# Patient Record
Sex: MALE | URBAN - METROPOLITAN AREA
[De-identification: names, ages, dates, MRNs, and addresses within clinical notes are randomized per-mention and may not be internally consistent; named-entity substitution may affect disease eponyms.]

---

## 2024-05-22 DIAGNOSIS — D72.829 LEUKOCYTOSIS, UNSPECIFIED TYPE: Primary | ICD-10-CM

## 2024-06-03 ENCOUNTER — TELEPHONE (OUTPATIENT)
Dept: HEMATOLOGY/ONCOLOGY | Facility: CLINIC | Age: 44
End: 2024-06-03

## 2025-02-27 ENCOUNTER — OFFICE VISIT (OUTPATIENT)
Dept: HEMATOLOGY/ONCOLOGY | Facility: CLINIC | Age: 45
End: 2025-02-27
Attending: INTERNAL MEDICINE
Payer: MEDICAID

## 2025-02-27 VITALS
SYSTOLIC BLOOD PRESSURE: 117 MMHG | BODY MASS INDEX: 42.95 KG/M2 | RESPIRATION RATE: 18 BRPM | HEIGHT: 65 IN | OXYGEN SATURATION: 96 % | WEIGHT: 257.81 LBS | HEART RATE: 98 BPM | TEMPERATURE: 98 F | DIASTOLIC BLOOD PRESSURE: 85 MMHG

## 2025-02-27 DIAGNOSIS — K52.9 CHRONIC DIARRHEA: ICD-10-CM

## 2025-02-27 DIAGNOSIS — F14.11 HISTORY OF COCAINE ABUSE: ICD-10-CM

## 2025-02-27 DIAGNOSIS — F15.11 HISTORY OF METHAMPHETAMINE ABUSE: ICD-10-CM

## 2025-02-27 DIAGNOSIS — Z86.19 HISTORY OF HEPATITIS C: ICD-10-CM

## 2025-02-27 DIAGNOSIS — D72.829 LEUKOCYTOSIS, UNSPECIFIED TYPE: ICD-10-CM

## 2025-02-27 DIAGNOSIS — D75.1 ERYTHROCYTOSIS: Primary | ICD-10-CM

## 2025-02-27 DIAGNOSIS — Z87.891 HISTORY OF SMOKING: ICD-10-CM

## 2025-02-27 DIAGNOSIS — F12.91 HISTORY OF MARIJUANA USE: ICD-10-CM

## 2025-02-27 DIAGNOSIS — F10.11 HISTORY OF ALCOHOL ABUSE: ICD-10-CM

## 2025-02-27 DIAGNOSIS — T38.3X5A ADVERSE EFFECT OF SODIUM-GLUCOSE COTRANSPORTER 2 (SGLT2) INHIBITOR: ICD-10-CM

## 2025-02-27 LAB
AFP-TM SERPL-MCNC: 2.8 NG/ML
ALBUMIN SERPL-MCNC: 4.1 G/DL (ref 3.5–5)
ALBUMIN/GLOB SERPL: 1 RATIO (ref 1.1–2)
ALP SERPL-CCNC: 97 UNIT/L (ref 40–150)
ALT SERPL-CCNC: 27 UNIT/L (ref 0–55)
ANION GAP SERPL CALC-SCNC: 9 MEQ/L
AST SERPL-CCNC: 17 UNIT/L (ref 5–34)
BASOPHILS # BLD AUTO: 0.08 X10(3)/MCL
BASOPHILS NFR BLD AUTO: 0.5 %
BILIRUB SERPL-MCNC: 0.3 MG/DL
BUN SERPL-MCNC: 11.8 MG/DL (ref 8.9–20.6)
CALCIUM SERPL-MCNC: 9.9 MG/DL (ref 8.4–10.2)
CHLORIDE SERPL-SCNC: 106 MMOL/L (ref 98–107)
CO2 SERPL-SCNC: 23 MMOL/L (ref 22–29)
CREAT SERPL-MCNC: 0.69 MG/DL (ref 0.72–1.25)
CREAT/UREA NIT SERPL: 17
EOSINOPHIL # BLD AUTO: 0.43 X10(3)/MCL (ref 0–0.9)
EOSINOPHIL NFR BLD AUTO: 2.6 %
ERYTHROCYTE [DISTWIDTH] IN BLOOD BY AUTOMATED COUNT: 11.6 % (ref 11.5–17)
GFR SERPLBLD CREATININE-BSD FMLA CKD-EPI: >60 ML/MIN/1.73/M2
GLOBULIN SER-MCNC: 4.1 GM/DL (ref 2.4–3.5)
GLUCOSE SERPL-MCNC: 140 MG/DL (ref 74–100)
HCT VFR BLD AUTO: 50.3 % (ref 42–52)
HGB BLD-MCNC: 17.5 G/DL (ref 14–18)
IMM GRANULOCYTES # BLD AUTO: 0.09 X10(3)/MCL (ref 0–0.04)
IMM GRANULOCYTES NFR BLD AUTO: 0.5 %
LYMPHOCYTES # BLD AUTO: 3.52 X10(3)/MCL (ref 0.6–4.6)
LYMPHOCYTES NFR BLD AUTO: 21.4 %
MCH RBC QN AUTO: 30.8 PG (ref 27–31)
MCHC RBC AUTO-ENTMCNC: 34.8 G/DL (ref 33–36)
MCV RBC AUTO: 88.4 FL (ref 80–94)
MONOCYTES # BLD AUTO: 1.02 X10(3)/MCL (ref 0.1–1.3)
MONOCYTES NFR BLD AUTO: 6.2 %
NEUTROPHILS # BLD AUTO: 11.34 X10(3)/MCL (ref 2.1–9.2)
NEUTROPHILS NFR BLD AUTO: 68.8 %
NRBC BLD AUTO-RTO: 0 %
PLATELET # BLD AUTO: 329 X10(3)/MCL (ref 130–400)
PMV BLD AUTO: 10.4 FL (ref 7.4–10.4)
POTASSIUM SERPL-SCNC: 3.7 MMOL/L (ref 3.5–5.1)
PROT SERPL-MCNC: 8.2 GM/DL (ref 6.4–8.3)
RBC # BLD AUTO: 5.69 X10(6)/MCL (ref 4.7–6.1)
SODIUM SERPL-SCNC: 138 MMOL/L (ref 136–145)
WBC # BLD AUTO: 16.48 X10(3)/MCL (ref 4.5–11.5)

## 2025-02-27 PROCEDURE — 99204 OFFICE O/P NEW MOD 45 MIN: CPT | Mod: S$PBB,,, | Performed by: INTERNAL MEDICINE

## 2025-02-27 PROCEDURE — 82105 ALPHA-FETOPROTEIN SERUM: CPT | Performed by: INTERNAL MEDICINE

## 2025-02-27 PROCEDURE — 1160F RVW MEDS BY RX/DR IN RCRD: CPT | Mod: CPTII,,, | Performed by: INTERNAL MEDICINE

## 2025-02-27 PROCEDURE — 3008F BODY MASS INDEX DOCD: CPT | Mod: CPTII,,, | Performed by: INTERNAL MEDICINE

## 2025-02-27 PROCEDURE — 85025 COMPLETE CBC W/AUTO DIFF WBC: CPT | Performed by: INTERNAL MEDICINE

## 2025-02-27 PROCEDURE — 82668 ASSAY OF ERYTHROPOIETIN: CPT | Performed by: INTERNAL MEDICINE

## 2025-02-27 PROCEDURE — 3079F DIAST BP 80-89 MM HG: CPT | Mod: CPTII,,, | Performed by: INTERNAL MEDICINE

## 2025-02-27 PROCEDURE — 1159F MED LIST DOCD IN RCRD: CPT | Mod: CPTII,,, | Performed by: INTERNAL MEDICINE

## 2025-02-27 PROCEDURE — 3074F SYST BP LT 130 MM HG: CPT | Mod: CPTII,,, | Performed by: INTERNAL MEDICINE

## 2025-02-27 PROCEDURE — 99214 OFFICE O/P EST MOD 30 MIN: CPT | Mod: PBBFAC | Performed by: INTERNAL MEDICINE

## 2025-02-27 PROCEDURE — 36415 COLL VENOUS BLD VENIPUNCTURE: CPT | Mod: 91 | Performed by: INTERNAL MEDICINE

## 2025-02-27 PROCEDURE — 4010F ACE/ARB THERAPY RXD/TAKEN: CPT | Mod: CPTII,,, | Performed by: INTERNAL MEDICINE

## 2025-02-27 PROCEDURE — 80053 COMPREHEN METABOLIC PANEL: CPT | Performed by: INTERNAL MEDICINE

## 2025-02-27 RX ORDER — CLONIDINE HYDROCHLORIDE 0.1 MG/1
0.1 TABLET ORAL 2 TIMES DAILY
COMMUNITY

## 2025-02-27 RX ORDER — FENOFIBRATE 145 MG/1
145 TABLET, FILM COATED ORAL
COMMUNITY

## 2025-02-27 RX ORDER — AMLODIPINE BESYLATE 10 MG/1
10 TABLET ORAL
COMMUNITY
Start: 2025-01-27

## 2025-02-27 RX ORDER — EMPAGLIFLOZIN 25 MG/1
25 TABLET, FILM COATED ORAL
COMMUNITY

## 2025-02-27 RX ORDER — METFORMIN HYDROCHLORIDE 500 MG/1
500 TABLET ORAL
COMMUNITY

## 2025-02-27 RX ORDER — TAMSULOSIN HYDROCHLORIDE 0.4 MG/1
1 CAPSULE ORAL
COMMUNITY
Start: 2025-01-27

## 2025-02-27 RX ORDER — LISINOPRIL 10 MG/1
10 TABLET ORAL
COMMUNITY

## 2025-02-27 RX ORDER — ROSUVASTATIN CALCIUM 10 MG/1
10 TABLET, COATED ORAL
COMMUNITY
Start: 2025-01-27

## 2025-02-27 RX ORDER — ASPIRIN 325 MG
50000 TABLET, DELAYED RELEASE (ENTERIC COATED) ORAL
COMMUNITY

## 2025-02-27 RX ORDER — GLIMEPIRIDE 1 MG/1
1 TABLET ORAL EVERY MORNING
COMMUNITY
Start: 2025-01-27

## 2025-02-27 NOTE — Clinical Note
Orders for 02/27/2025:  Check room air pulse oximetry, at rest, and after physical activity  Check JAK2 mutation, CALR mutation, MPL mutation, BCR-ABL1 fusion transcript in blood Check erythropoietin level Check carboxyhemoglobin level Whether the patient needs evaluation with bone marrow aspiration and core biopsy, we will be decided based upon results of requested investigations. Follow-up in 3 weeks

## 2025-02-27 NOTE — PROGRESS NOTES
History:  History reviewed. No pertinent past medical history.    Past Surgical History:   Procedure Laterality Date    COLONOSCOPY        Social History     Socioeconomic History    Marital status: Unknown   Tobacco Use    Smoking status: Every Day     Types: Cigarettes    Smokeless tobacco: Never   Substance and Sexual Activity    Alcohol use: Yes     Comment: occ    Drug use: Not Currently      Family History   Problem Relation Name Age of Onset    Diabetes Father      Hypertension Father          Reason for Follow-up:  Reason for consultation:   Erythrocytosis  Mild leukocytosis  History of hep C, treated  Chronic diarrhea    History of Present Illness:   Leukocytosis        Oncologic/Hematologic History:  Oncology History    No history exists.   Past medical history:  NIDDM; essential hypertension; transaminitis; history of hep C (treated by a GI in [place], 3 years ago); COVID-19 (brief hospitalization 02/07/2023-02/08/2023); dyslipidemia; hypovitaminosis D; low back pain; lymphocytosis; history of smoking; history of alcohol abuse; history of marijuana, methamphetamines, cocaine abuse  Procedure/surgical history:  Tonsillectomy 1987; left elbow fracture requiring cost 1989; left forearm fracture requiring gas 1995; right hand fracture 1997; uvula mass excision 2021  Social history: Lives in [place].  Has a girlfriend.  To children who are grown and gone.  Currently, not working.  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  Says that he has not worked in 5 years.  Says that the reason quit working, was because he has 5-20 loose bowel movements daily, probably as a side effect of medication that he is taking.  History of smoking: Smoked 1-2 ppd X 20 years, now down to 5-10 cigarettes daily x2 years; in his younger years, was exposed to secondhand smoke from his mother and grandparents  History of alcohol abuse: Used to drink 1 or 230 pack beer daily; drank for 8 years; lately, down  to 2 beers daily  History of substance Abuse:  Use marijuana, methamphetamine, and cocaine for several years; quit 5-6 years ago  Family history:  Both grandparents experienced melanoma of skin.  Health maintenance:  PCP in [place]  -02/27/2025: Says that he had screening colonoscopy done in [place] in January 2025, and that it was normal; did show small hemorrhoids; apparently, no polyps or cancer      44-year-old gentleman, referred by Maximiliano Parra MD, [facility], for evaluation of leukocytosis.    Investigations reviewed:  -10/31/2023:  WBC 12.6 minimally elevated, hemoglobin 17.5 elevated, hematocrit 51.6 elevated, MCV normal, platelets 350 K normal, neutrophils 62%, lymphocytes 25%, monocytes 7%  -01/30/2024:  WBC 11.9 minimally elevated, hemoglobin 18.2 elevated, hematocrit 54.5 elevated, MCV normal, platelets 330 K, neutrophils 63%, lymphocytes 25%  -05/14/2024:  WBC 13.3 minimally elevated, hemoglobin 17.7, hematocrit 52.4, MCV normal, platelets 319 K, neutrophils 64%, lymphocytes 24%, ANC 9.0 K, ALC 3.3 K  -05/23/2024: WBC 11.8 minimally elevated, hemoglobin 17.4, hematocrit 51.2, MCV normal, platelets 306 K, neutrophils 60%, lymphocytes 27%, monocytes 7%, eosinophils 4%, ANC 7.3 K, lymphocytes 3.2 K eosinophils 0.5 K  -08/14/2024: WBC 12.7, hemoglobin 17.1, hematocrit 50.4, MCV normal, platelets 323 K, neutrophils 58%, lymphocytes 28%, monocytes 8%, eosinophils 4%, basophils 1%  -11/14/2024: WBC 13.1 slightly elevated, hemoglobin 17.6, hematocrit 52.3, MCV normal, platelets 329 K, neutrophils 63%, lymphocytes 25%, monocytes 7%, eosinophils 3%, basophils 1%  -02/14/2025:  WBC 12.7 K, hemoglobin 17.2, hematocrit 51.2, MCV normal, platelets 343 K, neutrophils 60%, lymphocytes 26%, monocytes 7%, eosinophils 3%, basophils 1%    -10/31/2023: CMP unremarkable  -01/30/2024:  CMP unremarkable  -05/14/2024: CMP unremarkable  -05/23/2024: CMP unremarkable      02/27/2025:  Franky  healthy-appearing gentleman who presents for initial hematology consultation.  In no acute discomfort.    Chronic smoker.  Chronic alcoholic.  Has cut down on smoking.  Has cut down on alcohol.  Has quit marijuana, methamphetamine, and cocaine.  No aquagenic pruritus.  However, always has itching.  No erythromelalgia.  No history of fevers, chills, night sweats, anorexia, unintentional weight loss, or bone pains.  Chronic weakness and fatigue.  However, ECOG 0.  Appetite is okay.  No history of blood clots.  No history of DVT or pulmonary embolism.  No history of blood clots in unusual locations like portal vein, splenic vein, cerebral veins, etc..  No history of malignancy.  No history of exposure to carbon monoxide.  Not exposed to automobile exhausted.  Not exposed to Mariya or fumes.  Does not work enclosed spaces, exposed to weekly exon cysts.  No history of COPD that he is aware of.  Denies chronic dyspnea.  No heart problems.  No history of hereditary erythrocytosis.  No history of CKD/renal artery stenosis/renal cysts/hydronephrosis.  No history of adrenal tumors.  No history of cerebellar hemangiomas, hepatomas, renal cell carcinoma, or pheochromocytoma.  No history of blood dropping.  Does not use anabolic steroids.  No chest pains, abdominal pains, myalgias, weakness, fatigue, headache, blurred vision, transient loss of vision, paresthesias, slow mentation, or sense of depersonalization.  No history of LAILA.  No daytime sleepiness.  No history of cyanotic heart disease.  No history of intracardiac or intrapulmonary shunts.  No history of mucocutaneous telangiectasias.  Does not self inject erythropoiesis stimulating agents.    Interval History:  [No matching plan found]   [No matching plan found]     Allergies as of 02/27/2025    (No Known Allergies)     Medications:  Medications Ordered Prior to Encounter[1]    Review of Systems:   All systems reviewed and found to be negative except for the symptoms  detailed above    Physical Examination:   VITAL SIGNS:   Vitals:    02/27/25 1318   BP: 117/85   Pulse: 98   Resp: 18   Temp: 98.4 °F (36.9 °C)     GENERAL:  In no apparent distress.    HEAD:  No signs of head trauma.  EYES:  Pupils are equal.  Extraocular motions intact.    EARS:  Hearing grossly intact.  MOUTH:  Oropharynx is normal.   NECK:  No adenopathy, no JVD.     CHEST:  Chest with clear breath sounds bilaterally.  No wheezes, rales, rhonchi.    CARDIAC:  Regular rate and rhythm.  S1 and S2, without murmurs, gallops, rubs.  VASCULAR:  No Edema.  Peripheral pulses normal and equal in all extremities.  ABDOMEN:  Soft, without detectable tenderness.  No sign of distention.  No   rebound or guarding, and no masses palpated.   Bowel Sounds normal.  MUSCULOSKELETAL:  Good range of motion of all major joints. Extremities without clubbing, cyanosis or edema.    NEUROLOGIC EXAM:  Alert and oriented x 3.  No focal sensory or strength deficits.   Speech normal.  Follows commands.  PSYCHIATRIC:  Mood normal.    No results found for this or any previous visit.  No results found for this or any previous visit.    Assessment:  Problem List Items Addressed This Visit       Erythrocytosis - Primary    Leukocytosis    History of hepatitis C    Chronic diarrhea     Orders for 02/27/2025:   Check room air pulse oximetry, at rest, and after physical activity   Check JAK2 mutation, CALR mutation, MPL mutation, BCR-ABL1 fusion transcript in blood  Check erythropoietin level  Check carboxyhemoglobin level  Whether the patient needs evaluation with bone marrow aspiration and core biopsy, we will be decided based upon results of requested investigations.  -check AFP level   -ultrasound of liver and spleen, rule out any lesions/liver cirrhosis  Follow-up in 3 weeks    Above discussed at length with the patient.  All questions answered.    Discussed labs, differential diagnosis, and plan of management.    He understands and agrees with  this plan.  ===================================    # Erythrocytosis:     -10/31/2023:  WBC 12.6 minimally elevated, hemoglobin 17.5 elevated, hematocrit 51.6 elevated, MCV normal, platelets 350 K normal, neutrophils 62%, lymphocytes 25%, monocytes 7%  -01/30/2024:  WBC 11.9 minimally elevated, hemoglobin 18.2 elevated, hematocrit 54.5 elevated, MCV normal, platelets 330 K, neutrophils 63%, lymphocytes 25%  -05/14/2024:  WBC 13.3 minimally elevated, hemoglobin 17.7, hematocrit 52.4, MCV normal, platelets 319 K, neutrophils 64%, lymphocytes 24%, ANC 9.0 K, ALC 3.3 K  -05/23/2024: WBC 11.8 minimally elevated, hemoglobin 17.4, hematocrit 51.2, MCV normal, platelets 306 K, neutrophils 60%, lymphocytes 27%, monocytes 7%, eosinophils 4%, ANC 7.3 K, lymphocytes 3.2 K eosinophils 0.5 K  -08/14/2024: WBC 12.7, hemoglobin 17.1, hematocrit 50.4, MCV normal, platelets 323 K, neutrophils 58%, lymphocytes 28%, monocytes 8%, eosinophils 4%, basophils 1%  -11/14/2024: WBC 13.1 slightly elevated, hemoglobin 17.6, hematocrit 52.3, MCV normal, platelets 329 K, neutrophils 63%, lymphocytes 25%, monocytes 7%, eosinophils 3%, basophils 1%  -02/14/2025:  WBC 12.7 K, hemoglobin 17.2, hematocrit 51.2, MCV normal, platelets 343 K, neutrophils 60%, lymphocytes 26%, monocytes 7%, eosinophils 3%, basophils 1%  -History of smoking: Smoked 1-2 ppd X 20 years, now down to 5-10 cigarettes daily x2 years; in his younger years, was exposed to secondhand smoke from his mother and grandparents  -History of alcohol abuse: Used to drink 1 or 230 pack beer daily; drank for 8 years; lately, down to 2 beers daily  -History of substance Abuse:  Use marijuana, methamphetamine, and cocaine for several years; quit 5-6 years ago  -02/27/2025: Room air pulse oximetry:  96% at rest, normal; 95% after physical activity, normal  -02/27/2025:  Carboxyhemoglobin 2.2%, normal  >>>  Plan:  Erythrocytosis   Need to establish secondary erythrocytosis versus polycythemia  vera  Check room air pulse oximetry, at rest, and after physical activity   Check JAK2 mutation, CALR mutation, MPL mutation, BCR-ABL1 fusion transcript in blood  Check erythropoietin level  Carboxyhemoglobin level normal (see above)  Room air pulse oximetry normal (see above)  Whether the patient needs evaluation with bone marrow aspiration and core biopsy, we will be decided based upon results of requested investigations.  -check AFP level   -ultrasound of liver and spleen to rule out liver cirrhosis/liver lesions (history of Hepatitis C, treated 3 years ago; history of alcohol abuse)  Erythrocytosis can be secondary to chronic tobacco abuse, causing a state of hypoxia; strongly advised to quit smoking  He is taking Jardiance [Empagliflozin, a Sodium-Glucose Cotransporter 2 (SGLT2) Inhibitor]  SGLT2 (sodium-glucose dl-pxsrqcucwff-3) inhibitors are known to cause erythrocytosis  Chronic watery diarrhea; up to 5-20 loose stools daily; he attributes this to side effects of medications that he is taking; this can create a state of hypovolemia which can spuriously elevated H/H.    Follow-up in 3 weeks    Above discussed at length with the patient.  All questions answered.    Discussed labs, differential diagnosis, and plan of management.    He understands and agrees with this plan.    Follow-up:  No follow-ups on file.           [1]   Current Outpatient Medications on File Prior to Visit   Medication Sig Dispense Refill    amLODIPine (NORVASC) 10 MG tablet Take 10 mg by mouth.      cholecalciferol, vitamin D3, 1,250 mcg (50,000 unit) capsule Take 50,000 Units by mouth every 7 days.      cloNIDine (CATAPRES) 0.1 MG tablet Take 0.1 mg by mouth 2 (two) times daily.      fenofibrate (TRICOR) 145 MG tablet Take 145 mg by mouth.      glimepiride (AMARYL) 1 MG tablet Take 1 mg by mouth every morning.      JARDIANCE 25 mg tablet Take 25 mg by mouth.      lisinopriL 10 MG tablet Take 10 mg by mouth.      metFORMIN (GLUCOPHAGE)  500 MG tablet Take 500 mg by mouth.      rosuvastatin (CRESTOR) 10 MG tablet Take 10 mg by mouth.      tamsulosin (FLOMAX) 0.4 mg Cap Take 1 capsule by mouth.       No current facility-administered medications on file prior to visit.

## 2025-02-28 LAB — EPO SERPL-ACNC: 8.8 MIU/ML (ref 2.6–18.5)

## 2025-03-11 ENCOUNTER — HOSPITAL ENCOUNTER (OUTPATIENT)
Dept: RADIOLOGY | Facility: HOSPITAL | Age: 45
Discharge: HOME | End: 2025-03-11
Attending: INTERNAL MEDICINE
Payer: MEDICAID

## 2025-03-11 DIAGNOSIS — D75.1 ERYTHROCYTOSIS: ICD-10-CM

## 2025-03-11 DIAGNOSIS — D72.829 LEUKOCYTOSIS, UNSPECIFIED TYPE: ICD-10-CM

## 2025-03-11 DIAGNOSIS — Z86.19 HISTORY OF HEPATITIS C: ICD-10-CM

## 2025-03-11 PROCEDURE — 76700 US EXAM ABDOM COMPLETE: CPT | Mod: TC

## 2025-04-02 ENCOUNTER — TELEPHONE (OUTPATIENT)
Dept: HEMATOLOGY/ONCOLOGY | Facility: CLINIC | Age: 45
End: 2025-04-02
Payer: MEDICAID

## 2025-04-03 ENCOUNTER — TELEPHONE (OUTPATIENT)
Dept: HEMATOLOGY/ONCOLOGY | Facility: CLINIC | Age: 45
End: 2025-04-03
Payer: MEDICAID

## 2025-04-29 ENCOUNTER — OFFICE VISIT (OUTPATIENT)
Dept: HEMATOLOGY/ONCOLOGY | Facility: CLINIC | Age: 45
End: 2025-04-29
Attending: INTERNAL MEDICINE
Payer: MEDICAID

## 2025-04-29 VITALS
HEIGHT: 65 IN | HEART RATE: 90 BPM | SYSTOLIC BLOOD PRESSURE: 120 MMHG | RESPIRATION RATE: 18 BRPM | BODY MASS INDEX: 42.32 KG/M2 | DIASTOLIC BLOOD PRESSURE: 75 MMHG | OXYGEN SATURATION: 96 % | TEMPERATURE: 98 F | WEIGHT: 254 LBS

## 2025-04-29 DIAGNOSIS — K52.9 CHRONIC DIARRHEA: ICD-10-CM

## 2025-04-29 DIAGNOSIS — K76.0 HEPATIC STEATOSIS: ICD-10-CM

## 2025-04-29 DIAGNOSIS — D72.829 LEUKOCYTOSIS, UNSPECIFIED TYPE: ICD-10-CM

## 2025-04-29 DIAGNOSIS — F12.91 HISTORY OF MARIJUANA USE: ICD-10-CM

## 2025-04-29 DIAGNOSIS — F14.11 HISTORY OF COCAINE ABUSE: ICD-10-CM

## 2025-04-29 DIAGNOSIS — R16.0 HEPATOMEGALY: ICD-10-CM

## 2025-04-29 DIAGNOSIS — F10.11 HISTORY OF ALCOHOL ABUSE: Primary | ICD-10-CM

## 2025-04-29 DIAGNOSIS — D75.1 ERYTHROCYTOSIS: ICD-10-CM

## 2025-04-29 DIAGNOSIS — D75.1 ERYTHROCYTOSIS: Primary | ICD-10-CM

## 2025-04-29 DIAGNOSIS — F15.11 HISTORY OF METHAMPHETAMINE ABUSE: ICD-10-CM

## 2025-04-29 PROCEDURE — 1159F MED LIST DOCD IN RCRD: CPT | Mod: CPTII,,, | Performed by: INTERNAL MEDICINE

## 2025-04-29 PROCEDURE — 3074F SYST BP LT 130 MM HG: CPT | Mod: CPTII,,, | Performed by: INTERNAL MEDICINE

## 2025-04-29 PROCEDURE — 4010F ACE/ARB THERAPY RXD/TAKEN: CPT | Mod: CPTII,,, | Performed by: INTERNAL MEDICINE

## 2025-04-29 PROCEDURE — 3008F BODY MASS INDEX DOCD: CPT | Mod: CPTII,,, | Performed by: INTERNAL MEDICINE

## 2025-04-29 PROCEDURE — 99214 OFFICE O/P EST MOD 30 MIN: CPT | Mod: PBBFAC | Performed by: INTERNAL MEDICINE

## 2025-04-29 PROCEDURE — 99214 OFFICE O/P EST MOD 30 MIN: CPT | Mod: S$PBB,,, | Performed by: INTERNAL MEDICINE

## 2025-04-29 PROCEDURE — 3078F DIAST BP <80 MM HG: CPT | Mod: CPTII,,, | Performed by: INTERNAL MEDICINE

## 2025-04-29 PROCEDURE — 1160F RVW MEDS BY RX/DR IN RCRD: CPT | Mod: CPTII,,, | Performed by: INTERNAL MEDICINE

## 2025-04-29 RX ORDER — SITAGLIPTIN 100 MG/1
100 TABLET, FILM COATED ORAL
COMMUNITY
Start: 2025-04-15

## 2025-04-29 NOTE — PROGRESS NOTES
History:  History reviewed. No pertinent past medical history.    Past Surgical History:   Procedure Laterality Date    COLONOSCOPY        Social History     Socioeconomic History    Marital status: Single   Tobacco Use    Smoking status: Every Day     Types: Cigarettes    Smokeless tobacco: Never   Substance and Sexual Activity    Alcohol use: Yes     Comment: occ    Drug use: Not Currently      Family History   Problem Relation Name Age of Onset    Diabetes Father      Hypertension Father        Reason for Follow-up:  Erythrocytosis  Mild leukocytosis  History of hep C, treated  Hepatic steatosis, hepatomegaly  Chronic diarrhea    History of Present Illness:   Erythrocytosis        Oncologic/Hematologic History:  Oncology History    No history exists.   Past medical history:  NIDDM; essential hypertension; transaminitis; history of hep C (treated by a GI in [place], 3 years ago); COVID-19 (brief hospitalization 02/07/2023-02/08/2023); dyslipidemia; hypovitaminosis D; low back pain; lymphocytosis; history of smoking; history of alcohol abuse; history of marijuana, methamphetamines, cocaine abuse  Procedure/surgical history:  Tonsillectomy 1987; left elbow fracture requiring cost 1989; left forearm fracture requiring gas 1995; right hand fracture 1997; uvula mass excision 2021  Social history: Lives in [place].  Has a girlfriend.  To children who are grown and gone.  Currently, not working.  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  Says that he has not worked in 5 years.  Says that the reason quit working, was because he has 5-20 loose bowel movements daily, probably as a side effect of medication that he is taking.  History of smoking: Smoked 1-2 ppd X 20 years, now down to 5-10 cigarettes daily x2 years; in his younger years, was exposed to secondhand smoke from his mother and grandparents  History of alcohol abuse: Used to drink 1 or 230 pack beer daily; drank for 8 years; lately,  down to 2-3 beers yearly  History of substance Abuse:  Use marijuana, methamphetamine, and cocaine for several years; quit 5-6 years ago  Family history:  Both grandparents experienced melanoma of skin.  Health maintenance:  PCP in [place]  -02/27/2025: Says that he had screening colonoscopy done in [place] in January 2025, and that it was normal; did show small hemorrhoids; apparently, no polyps or cancer      44-year-old gentleman, referred by Maximiliano Parra MD, [facility], for evaluation of leukocytosis.    Investigations reviewed:  -10/31/2023:  WBC 12.6 minimally elevated, hemoglobin 17.5 elevated, hematocrit 51.6 elevated, MCV normal, platelets 350 K normal, neutrophils 62%, lymphocytes 25%, monocytes 7%  -01/30/2024:  WBC 11.9 minimally elevated, hemoglobin 18.2 elevated, hematocrit 54.5 elevated, MCV normal, platelets 330 K, neutrophils 63%, lymphocytes 25%  -05/14/2024:  WBC 13.3 minimally elevated, hemoglobin 17.7, hematocrit 52.4, MCV normal, platelets 319 K, neutrophils 64%, lymphocytes 24%, ANC 9.0 K, ALC 3.3 K  -05/23/2024: WBC 11.8 minimally elevated, hemoglobin 17.4, hematocrit 51.2, MCV normal, platelets 306 K, neutrophils 60%, lymphocytes 27%, monocytes 7%, eosinophils 4%, ANC 7.3 K, lymphocytes 3.2 K eosinophils 0.5 K  -08/14/2024: WBC 12.7, hemoglobin 17.1, hematocrit 50.4, MCV normal, platelets 323 K, neutrophils 58%, lymphocytes 28%, monocytes 8%, eosinophils 4%, basophils 1%  -11/14/2024: WBC 13.1 slightly elevated, hemoglobin 17.6, hematocrit 52.3, MCV normal, platelets 329 K, neutrophils 63%, lymphocytes 25%, monocytes 7%, eosinophils 3%, basophils 1%  -02/14/2025:  WBC 12.7 K, hemoglobin 17.2, hematocrit 51.2, MCV normal, platelets 343 K, neutrophils 60%, lymphocytes 26%, monocytes 7%, eosinophils 3%, basophils 1%    -10/31/2023: CMP unremarkable  -01/30/2024:  CMP unremarkable  -05/14/2024: CMP unremarkable  -05/23/2024: CMP unremarkable      02/27/2025:  Franky  healthy-appearing gentleman who presents for initial hematology consultation.  In no acute discomfort.    Chronic smoker.  Chronic alcoholic.  Has cut down on smoking.  Has cut down on alcohol.  Has quit marijuana, methamphetamine, and cocaine.  No aquagenic pruritus.  However, always has itching.  No erythromelalgia.  No history of fevers, chills, night sweats, anorexia, unintentional weight loss, or bone pains.  Chronic weakness and fatigue.  However, ECOG 0.  Appetite is okay.  No history of blood clots.  No history of DVT or pulmonary embolism.  No history of blood clots in unusual locations like portal vein, splenic vein, cerebral veins, etc..  No history of malignancy.  No history of exposure to carbon monoxide.  Not exposed to automobile exhausted.  Not exposed to Mariya or fumes.  Does not work enclosed spaces, exposed to weekly exon cysts.  No history of COPD that he is aware of.  Denies chronic dyspnea.  No heart problems.  No history of hereditary erythrocytosis.  No history of CKD/renal artery stenosis/renal cysts/hydronephrosis.  No history of adrenal tumors.  No history of cerebellar hemangiomas, hepatomas, renal cell carcinoma, or pheochromocytoma.  No history of blood dropping.  Does not use anabolic steroids.  No chest pains, abdominal pains, myalgias, weakness, fatigue, headache, blurred vision, transient loss of vision, paresthesias, slow mentation, or sense of depersonalization.  No history of LAILA.  No daytime sleepiness.  No history of cyanotic heart disease.  No history of intracardiac or intrapulmonary shunts.  No history of mucocutaneous telangiectasias.  Does not self inject erythropoiesis stimulating agents.    Interval History:  [No matching plan found]   [No matching plan found]     04/29/2025:   -02/27/2025:  Peripheral blood:  JAK2 V617F mutation negative; JAK2 exon 12 mutation negative; JAK2 exon 13 mutation negative; MPL mutation negative; CALR mutation negative  -02/27/2025:   Peripheral blood: BCR-ABL1 fusion transcript negative  -02/27/2025:  WBC 16.48; hemoglobin 17.5; hematocrit 50.3; platelets normal; differential count unremarkable  -02/27/2025:  CMP reviewed, essentially unremarkable  -02/27/2025: AFP level 2.0 normal  02/27/2025:  Erythropoietin level 8.8 normal (reference Range:  2.6-18.5 mIU /ml)  -03/11/2025:  Complete abdominal ultrasound: Hepatic enlargement with steatosis; liver measures 23.4 cm; spleen measures 14.6 cm  Presents for a follow-up visit.  In no acute discomfort.  Chronic smoker.  Chronic alcoholic.  Has cut down on smoking.  Has cut down on alcohol.    Has quit marijuana, methamphetamine, and cocaine.    No aquagenic pruritus.  However, always has itching.  No erythromelalgia.  No history of fevers, chills, night sweats, anorexia, unintentional weight loss, or bone pains.    Chronic weakness and fatigue.  However, ECOG 0.    Appetite is okay.  No history of blood clots.  No history of DVT or pulmonary embolism.  No history of blood clots in unusual locations like portal vein, splenic vein, cerebral veins, etc..  No history of malignancy.  No history of exposure to carbon monoxide.  Not exposed to automobile exhausted.  Not exposed to Mariya or fumes.  Does not work enclosed spaces, exposed to weekly exon cysts.  No history of COPD that he is aware of.  Denies chronic dyspnea.  No heart problems.  No history of hereditary erythrocytosis.  No history of CKD/renal artery stenosis/renal cysts/hydronephrosis.  No history of adrenal tumors.  No history of cerebellar hemangiomas, hepatomas, renal cell carcinoma, or pheochromocytoma.  No history of blood dropping.  Does not use anabolic steroids.  No chest pains, abdominal pains, myalgias, weakness, fatigue, headache, blurred vision, transient loss of vision, paresthesias, slow mentation, or sense of depersonalization.  No history of LAILA.  No daytime sleepiness.  No history of cyanotic heart disease.  No history of  intracardiac or intrapulmonary shunts.  No history of mucocutaneous telangiectasias.  Does not self inject erythropoiesis stimulating agents.    Allergies as of 04/29/2025    (No Known Allergies)     Medications:  Medications Ordered Prior to Encounter[1]    Review of Systems:   All systems reviewed and found to be negative except for the symptoms detailed above    Physical Examination:   VITAL SIGNS:   Vitals:    04/29/25 1500   BP: 120/75   Pulse: 90   Resp: 18   Temp: 98.4 °F (36.9 °C)     GENERAL:  In no apparent distress.    HEAD:  No signs of head trauma.  EYES:  Pupils are equal.  Extraocular motions intact.    EARS:  Hearing grossly intact.  MOUTH:  Oropharynx is normal.   NECK:  No adenopathy, no JVD.     CHEST:  Chest with clear breath sounds bilaterally.  No wheezes, rales, rhonchi.    CARDIAC:  Regular rate and rhythm.  S1 and S2, without murmurs, gallops, rubs.  VASCULAR:  No Edema.  Peripheral pulses normal and equal in all extremities.  ABDOMEN:  Soft, without detectable tenderness.  No sign of distention.  No   rebound or guarding, and no masses palpated.   Bowel Sounds normal.  MUSCULOSKELETAL:  Good range of motion of all major joints. Extremities without clubbing, cyanosis or edema.    NEUROLOGIC EXAM:  Alert and oriented x 3.  No focal sensory or strength deficits.   Speech normal.  Follows commands.  PSYCHIATRIC:  Mood normal.    No results found for this or any previous visit.  No results found for this or any previous visit.    Assessment:  Problem List Items Addressed This Visit       Erythrocytosis    Chronic diarrhea    History of alcohol abuse - Primary    History of methamphetamine abuse    History of cocaine abuse    History of marijuana use    Hepatic steatosis    Hepatomegaly       Orders for 04/29/2025:   Bone marrow aspiration and core biopsy   Flow cytometry of blood (leukemia/lymphoma panel)  -04/29/2025: Refer to GI for hepatic steatosis  Follow-up in 3 weeks    Above discussed  with the patient.  All questions answered.   Discussed labs and plan of management.   He understands and agrees with this plan.  ===================================    # Erythrocytosis:     -10/31/2023:  WBC 12.6 minimally elevated, hemoglobin 17.5 elevated, hematocrit 51.6 elevated, MCV normal, platelets 350 K normal, neutrophils 62%, lymphocytes 25%, monocytes 7%  -01/30/2024:  WBC 11.9 minimally elevated, hemoglobin 18.2 elevated, hematocrit 54.5 elevated, MCV normal, platelets 330 K, neutrophils 63%, lymphocytes 25%  -05/14/2024:  WBC 13.3 minimally elevated, hemoglobin 17.7, hematocrit 52.4, MCV normal, platelets 319 K, neutrophils 64%, lymphocytes 24%, ANC 9.0 K, ALC 3.3 K  -05/23/2024: WBC 11.8 minimally elevated, hemoglobin 17.4, hematocrit 51.2, MCV normal, platelets 306 K, neutrophils 60%, lymphocytes 27%, monocytes 7%, eosinophils 4%, ANC 7.3 K, lymphocytes 3.2 K eosinophils 0.5 K  -08/14/2024: WBC 12.7, hemoglobin 17.1, hematocrit 50.4, MCV normal, platelets 323 K, neutrophils 58%, lymphocytes 28%, monocytes 8%, eosinophils 4%, basophils 1%  -11/14/2024: WBC 13.1 slightly elevated, hemoglobin 17.6, hematocrit 52.3, MCV normal, platelets 329 K, neutrophils 63%, lymphocytes 25%, monocytes 7%, eosinophils 3%, basophils 1%  -02/14/2025:  WBC 12.7 K, hemoglobin 17.2, hematocrit 51.2, MCV normal, platelets 343 K, neutrophils 60%, lymphocytes 26%, monocytes 7%, eosinophils 3%, basophils 1%  -History of smoking: Smoked 1-2 ppd X 20 years, now down to 5-10 cigarettes daily x2 years; in his younger years, was exposed to secondhand smoke from his mother and grandparents  -History of alcohol abuse: Used to drink 1 or 2 30 pack beer daily; drank for 8 years; lately, down to 2 beers daily  -History of substance Abuse:  Use marijuana, methamphetamine, and cocaine for several years; quit 5-6 years ago  -02/27/2025: Room air pulse oximetry:  96% at rest, normal; 95% after physical activity, normal  -02/27/2025:   Carboxyhemoglobin 2.2%, normal  -02/27/2025:  Peripheral blood:  JAK2 V617F mutation negative; JAK2 exon 12 mutation negative; JAK2 exon 13 mutation negative; MPL mutation negative; CALR mutation negative  -02/27/2025:  Peripheral blood: BCR-ABL1 fusion transcript negative  -02/27/2025:  WBC 16.48; hemoglobin 17.5; hematocrit 50.3; platelets normal; differential count unremarkable  -02/27/2025:  CMP reviewed, essentially unremarkable  -02/27/2025: AFP level 2.0 normal  02/27/2025:  Erythropoietin level 8.8 normal (reference Range:  2.6-18.5 mIU /ml)  -03/11/2025:  Complete abdominal ultrasound: Hepatic enlargement with steatosis; liver measures 23.4 cm; spleen measures 14.6 cm  >>>  Plan:  Chronic erythrocytosis   JAK2, MPL, CALR mutations negative  BCR-ABL1 fusion transcript negative  Mild, neutrophilic leukocytosis as well  History of smoking, has cut down   History of alcohol abuse, has cut down  History of substance abuse, has quit  Room air pulse oximetry normal   Carboxyhemoglobin level is not elevated  Erythropoietin level is not 7 normal but appears to be suppressed, 8.8, despite erythrocytosis  We will order bone marrow aspiration and core biopsy to rule out polycythemia vera  Abdominal ultrasound shows hepatomegaly and hepatic steatosis  AFP level normal on 02/27/2025  We will refer to GI for evaluation of hepatic steatosis and hepatomegaly    Erythrocytosis can be secondary to chronic tobacco abuse, causing a state of hypoxia; strongly advised to quit smoking  He is taking Jardiance [Empagliflozin, a Sodium-Glucose Cotransporter 2 (SGLT2) Inhibitor]  SGLT2 (sodium-glucose ur-nunybuwfqpa-7) inhibitors are known to cause erythrocytosis  Chronic watery diarrhea; up to 5-20 loose stools daily; he attributes this to side effects of medications that he is taking; this can create a state of hypovolemia which can spuriously elevated H/H.    Follow-up in 3 weeks, after bone marrow evaluation    Above discussed at  length with the patient.  All questions answered.    Discussed labs, differential diagnosis, and plan of management.    He understands and agrees with this plan.    Follow-up:  No follow-ups on file.             [1]   Current Outpatient Medications on File Prior to Visit   Medication Sig Dispense Refill    amLODIPine (NORVASC) 10 MG tablet Take 10 mg by mouth.      cholecalciferol, vitamin D3, 1,250 mcg (50,000 unit) capsule Take 50,000 Units by mouth every 7 days.      cloNIDine (CATAPRES) 0.1 MG tablet Take 0.1 mg by mouth 2 (two) times daily.      fenofibrate (TRICOR) 145 MG tablet Take 145 mg by mouth.      glimepiride (AMARYL) 1 MG tablet Take 1 mg by mouth every morning.      JANUVIA 100 mg Tab Take 100 mg by mouth.      JARDIANCE 25 mg tablet Take 25 mg by mouth.      lisinopriL 10 MG tablet Take 10 mg by mouth.      metFORMIN (GLUCOPHAGE) 500 MG tablet Take 500 mg by mouth.      rosuvastatin (CRESTOR) 10 MG tablet Take 10 mg by mouth.      tamsulosin (FLOMAX) 0.4 mg Cap Take 1 capsule by mouth.       No current facility-administered medications on file prior to visit.

## 2025-04-29 NOTE — H&P (VIEW-ONLY)
History:  History reviewed. No pertinent past medical history.    Past Surgical History:   Procedure Laterality Date    COLONOSCOPY        Social History     Socioeconomic History    Marital status: Single   Tobacco Use    Smoking status: Every Day     Types: Cigarettes    Smokeless tobacco: Never   Substance and Sexual Activity    Alcohol use: Yes     Comment: occ    Drug use: Not Currently      Family History   Problem Relation Name Age of Onset    Diabetes Father      Hypertension Father        Reason for Follow-up:  Erythrocytosis  Mild leukocytosis  History of hep C, treated  Hepatic steatosis, hepatomegaly  Chronic diarrhea    History of Present Illness:   Erythrocytosis        Oncologic/Hematologic History:  Oncology History    No history exists.   Past medical history:  NIDDM; essential hypertension; transaminitis; history of hep C (treated by a GI in [place], 3 years ago); COVID-19 (brief hospitalization 02/07/2023-02/08/2023); dyslipidemia; hypovitaminosis D; low back pain; lymphocytosis; history of smoking; history of alcohol abuse; history of marijuana, methamphetamines, cocaine abuse  Procedure/surgical history:  Tonsillectomy 1987; left elbow fracture requiring cost 1989; left forearm fracture requiring gas 1995; right hand fracture 1997; uvula mass excision 2021  Social history: Lives in [place].  Has a girlfriend.  To children who are grown and gone.  Currently, not working.  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  Says that he has not worked in 5 years.  Says that the reason quit working, was because he has 5-20 loose bowel movements daily, probably as a side effect of medication that he is taking.  History of smoking: Smoked 1-2 ppd X 20 years, now down to 5-10 cigarettes daily x2 years; in his younger years, was exposed to secondhand smoke from his mother and grandparents  History of alcohol abuse: Used to drink 1 or 230 pack beer daily; drank for 8 years; lately,  down to 2-3 beers yearly  History of substance Abuse:  Use marijuana, methamphetamine, and cocaine for several years; quit 5-6 years ago  Family history:  Both grandparents experienced melanoma of skin.  Health maintenance:  PCP in [place]  -02/27/2025: Says that he had screening colonoscopy done in [place] in January 2025, and that it was normal; did show small hemorrhoids; apparently, no polyps or cancer      44-year-old gentleman, referred by Maximiliano Parra MD, [facility], for evaluation of leukocytosis.    Investigations reviewed:  -10/31/2023:  WBC 12.6 minimally elevated, hemoglobin 17.5 elevated, hematocrit 51.6 elevated, MCV normal, platelets 350 K normal, neutrophils 62%, lymphocytes 25%, monocytes 7%  -01/30/2024:  WBC 11.9 minimally elevated, hemoglobin 18.2 elevated, hematocrit 54.5 elevated, MCV normal, platelets 330 K, neutrophils 63%, lymphocytes 25%  -05/14/2024:  WBC 13.3 minimally elevated, hemoglobin 17.7, hematocrit 52.4, MCV normal, platelets 319 K, neutrophils 64%, lymphocytes 24%, ANC 9.0 K, ALC 3.3 K  -05/23/2024: WBC 11.8 minimally elevated, hemoglobin 17.4, hematocrit 51.2, MCV normal, platelets 306 K, neutrophils 60%, lymphocytes 27%, monocytes 7%, eosinophils 4%, ANC 7.3 K, lymphocytes 3.2 K eosinophils 0.5 K  -08/14/2024: WBC 12.7, hemoglobin 17.1, hematocrit 50.4, MCV normal, platelets 323 K, neutrophils 58%, lymphocytes 28%, monocytes 8%, eosinophils 4%, basophils 1%  -11/14/2024: WBC 13.1 slightly elevated, hemoglobin 17.6, hematocrit 52.3, MCV normal, platelets 329 K, neutrophils 63%, lymphocytes 25%, monocytes 7%, eosinophils 3%, basophils 1%  -02/14/2025:  WBC 12.7 K, hemoglobin 17.2, hematocrit 51.2, MCV normal, platelets 343 K, neutrophils 60%, lymphocytes 26%, monocytes 7%, eosinophils 3%, basophils 1%    -10/31/2023: CMP unremarkable  -01/30/2024:  CMP unremarkable  -05/14/2024: CMP unremarkable  -05/23/2024: CMP unremarkable      02/27/2025:  Franky  healthy-appearing gentleman who presents for initial hematology consultation.  In no acute discomfort.    Chronic smoker.  Chronic alcoholic.  Has cut down on smoking.  Has cut down on alcohol.  Has quit marijuana, methamphetamine, and cocaine.  No aquagenic pruritus.  However, always has itching.  No erythromelalgia.  No history of fevers, chills, night sweats, anorexia, unintentional weight loss, or bone pains.  Chronic weakness and fatigue.  However, ECOG 0.  Appetite is okay.  No history of blood clots.  No history of DVT or pulmonary embolism.  No history of blood clots in unusual locations like portal vein, splenic vein, cerebral veins, etc..  No history of malignancy.  No history of exposure to carbon monoxide.  Not exposed to automobile exhausted.  Not exposed to Mariya or fumes.  Does not work enclosed spaces, exposed to weekly exon cysts.  No history of COPD that he is aware of.  Denies chronic dyspnea.  No heart problems.  No history of hereditary erythrocytosis.  No history of CKD/renal artery stenosis/renal cysts/hydronephrosis.  No history of adrenal tumors.  No history of cerebellar hemangiomas, hepatomas, renal cell carcinoma, or pheochromocytoma.  No history of blood dropping.  Does not use anabolic steroids.  No chest pains, abdominal pains, myalgias, weakness, fatigue, headache, blurred vision, transient loss of vision, paresthesias, slow mentation, or sense of depersonalization.  No history of LAILA.  No daytime sleepiness.  No history of cyanotic heart disease.  No history of intracardiac or intrapulmonary shunts.  No history of mucocutaneous telangiectasias.  Does not self inject erythropoiesis stimulating agents.    Interval History:  [No matching plan found]   [No matching plan found]     04/29/2025:   -02/27/2025:  Peripheral blood:  JAK2 V617F mutation negative; JAK2 exon 12 mutation negative; JAK2 exon 13 mutation negative; MPL mutation negative; CALR mutation negative  -02/27/2025:   Peripheral blood: BCR-ABL1 fusion transcript negative  -02/27/2025:  WBC 16.48; hemoglobin 17.5; hematocrit 50.3; platelets normal; differential count unremarkable  -02/27/2025:  CMP reviewed, essentially unremarkable  -02/27/2025: AFP level 2.0 normal  02/27/2025:  Erythropoietin level 8.8 normal (reference Range:  2.6-18.5 mIU /ml)  -03/11/2025:  Complete abdominal ultrasound: Hepatic enlargement with steatosis; liver measures 23.4 cm; spleen measures 14.6 cm  Presents for a follow-up visit.  In no acute discomfort.  Chronic smoker.  Chronic alcoholic.  Has cut down on smoking.  Has cut down on alcohol.    Has quit marijuana, methamphetamine, and cocaine.    No aquagenic pruritus.  However, always has itching.  No erythromelalgia.  No history of fevers, chills, night sweats, anorexia, unintentional weight loss, or bone pains.    Chronic weakness and fatigue.  However, ECOG 0.    Appetite is okay.  No history of blood clots.  No history of DVT or pulmonary embolism.  No history of blood clots in unusual locations like portal vein, splenic vein, cerebral veins, etc..  No history of malignancy.  No history of exposure to carbon monoxide.  Not exposed to automobile exhausted.  Not exposed to Mariya or fumes.  Does not work enclosed spaces, exposed to weekly exon cysts.  No history of COPD that he is aware of.  Denies chronic dyspnea.  No heart problems.  No history of hereditary erythrocytosis.  No history of CKD/renal artery stenosis/renal cysts/hydronephrosis.  No history of adrenal tumors.  No history of cerebellar hemangiomas, hepatomas, renal cell carcinoma, or pheochromocytoma.  No history of blood dropping.  Does not use anabolic steroids.  No chest pains, abdominal pains, myalgias, weakness, fatigue, headache, blurred vision, transient loss of vision, paresthesias, slow mentation, or sense of depersonalization.  No history of LAILA.  No daytime sleepiness.  No history of cyanotic heart disease.  No history of  intracardiac or intrapulmonary shunts.  No history of mucocutaneous telangiectasias.  Does not self inject erythropoiesis stimulating agents.    Allergies as of 04/29/2025    (No Known Allergies)     Medications:  Medications Ordered Prior to Encounter[1]    Review of Systems:   All systems reviewed and found to be negative except for the symptoms detailed above    Physical Examination:   VITAL SIGNS:   Vitals:    04/29/25 1500   BP: 120/75   Pulse: 90   Resp: 18   Temp: 98.4 °F (36.9 °C)     GENERAL:  In no apparent distress.    HEAD:  No signs of head trauma.  EYES:  Pupils are equal.  Extraocular motions intact.    EARS:  Hearing grossly intact.  MOUTH:  Oropharynx is normal.   NECK:  No adenopathy, no JVD.     CHEST:  Chest with clear breath sounds bilaterally.  No wheezes, rales, rhonchi.    CARDIAC:  Regular rate and rhythm.  S1 and S2, without murmurs, gallops, rubs.  VASCULAR:  No Edema.  Peripheral pulses normal and equal in all extremities.  ABDOMEN:  Soft, without detectable tenderness.  No sign of distention.  No   rebound or guarding, and no masses palpated.   Bowel Sounds normal.  MUSCULOSKELETAL:  Good range of motion of all major joints. Extremities without clubbing, cyanosis or edema.    NEUROLOGIC EXAM:  Alert and oriented x 3.  No focal sensory or strength deficits.   Speech normal.  Follows commands.  PSYCHIATRIC:  Mood normal.    No results found for this or any previous visit.  No results found for this or any previous visit.    Assessment:  Problem List Items Addressed This Visit       Erythrocytosis    Chronic diarrhea    History of alcohol abuse - Primary    History of methamphetamine abuse    History of cocaine abuse    History of marijuana use    Hepatic steatosis    Hepatomegaly       Orders for 04/29/2025:   Bone marrow aspiration and core biopsy   Flow cytometry of blood (leukemia/lymphoma panel)  -04/29/2025: Refer to GI for hepatic steatosis  Follow-up in 3 weeks    Above discussed  with the patient.  All questions answered.   Discussed labs and plan of management.   He understands and agrees with this plan.  ===================================    # Erythrocytosis:     -10/31/2023:  WBC 12.6 minimally elevated, hemoglobin 17.5 elevated, hematocrit 51.6 elevated, MCV normal, platelets 350 K normal, neutrophils 62%, lymphocytes 25%, monocytes 7%  -01/30/2024:  WBC 11.9 minimally elevated, hemoglobin 18.2 elevated, hematocrit 54.5 elevated, MCV normal, platelets 330 K, neutrophils 63%, lymphocytes 25%  -05/14/2024:  WBC 13.3 minimally elevated, hemoglobin 17.7, hematocrit 52.4, MCV normal, platelets 319 K, neutrophils 64%, lymphocytes 24%, ANC 9.0 K, ALC 3.3 K  -05/23/2024: WBC 11.8 minimally elevated, hemoglobin 17.4, hematocrit 51.2, MCV normal, platelets 306 K, neutrophils 60%, lymphocytes 27%, monocytes 7%, eosinophils 4%, ANC 7.3 K, lymphocytes 3.2 K eosinophils 0.5 K  -08/14/2024: WBC 12.7, hemoglobin 17.1, hematocrit 50.4, MCV normal, platelets 323 K, neutrophils 58%, lymphocytes 28%, monocytes 8%, eosinophils 4%, basophils 1%  -11/14/2024: WBC 13.1 slightly elevated, hemoglobin 17.6, hematocrit 52.3, MCV normal, platelets 329 K, neutrophils 63%, lymphocytes 25%, monocytes 7%, eosinophils 3%, basophils 1%  -02/14/2025:  WBC 12.7 K, hemoglobin 17.2, hematocrit 51.2, MCV normal, platelets 343 K, neutrophils 60%, lymphocytes 26%, monocytes 7%, eosinophils 3%, basophils 1%  -History of smoking: Smoked 1-2 ppd X 20 years, now down to 5-10 cigarettes daily x2 years; in his younger years, was exposed to secondhand smoke from his mother and grandparents  -History of alcohol abuse: Used to drink 1 or 2 30 pack beer daily; drank for 8 years; lately, down to 2 beers daily  -History of substance Abuse:  Use marijuana, methamphetamine, and cocaine for several years; quit 5-6 years ago  -02/27/2025: Room air pulse oximetry:  96% at rest, normal; 95% after physical activity, normal  -02/27/2025:   Carboxyhemoglobin 2.2%, normal  -02/27/2025:  Peripheral blood:  JAK2 V617F mutation negative; JAK2 exon 12 mutation negative; JAK2 exon 13 mutation negative; MPL mutation negative; CALR mutation negative  -02/27/2025:  Peripheral blood: BCR-ABL1 fusion transcript negative  -02/27/2025:  WBC 16.48; hemoglobin 17.5; hematocrit 50.3; platelets normal; differential count unremarkable  -02/27/2025:  CMP reviewed, essentially unremarkable  -02/27/2025: AFP level 2.0 normal  02/27/2025:  Erythropoietin level 8.8 normal (reference Range:  2.6-18.5 mIU /ml)  -03/11/2025:  Complete abdominal ultrasound: Hepatic enlargement with steatosis; liver measures 23.4 cm; spleen measures 14.6 cm  >>>  Plan:  Chronic erythrocytosis   JAK2, MPL, CALR mutations negative  BCR-ABL1 fusion transcript negative  Mild, neutrophilic leukocytosis as well  History of smoking, has cut down   History of alcohol abuse, has cut down  History of substance abuse, has quit  Room air pulse oximetry normal   Carboxyhemoglobin level is not elevated  Erythropoietin level is not 7 normal but appears to be suppressed, 8.8, despite erythrocytosis  We will order bone marrow aspiration and core biopsy to rule out polycythemia vera  Abdominal ultrasound shows hepatomegaly and hepatic steatosis  AFP level normal on 02/27/2025  We will refer to GI for evaluation of hepatic steatosis and hepatomegaly    Erythrocytosis can be secondary to chronic tobacco abuse, causing a state of hypoxia; strongly advised to quit smoking  He is taking Jardiance [Empagliflozin, a Sodium-Glucose Cotransporter 2 (SGLT2) Inhibitor]  SGLT2 (sodium-glucose lj-gzgnnqkhzst-9) inhibitors are known to cause erythrocytosis  Chronic watery diarrhea; up to 5-20 loose stools daily; he attributes this to side effects of medications that he is taking; this can create a state of hypovolemia which can spuriously elevated H/H.    Follow-up in 3 weeks, after bone marrow evaluation    Above discussed at  length with the patient.  All questions answered.    Discussed labs, differential diagnosis, and plan of management.    He understands and agrees with this plan.    Follow-up:  No follow-ups on file.             [1]   Current Outpatient Medications on File Prior to Visit   Medication Sig Dispense Refill    amLODIPine (NORVASC) 10 MG tablet Take 10 mg by mouth.      cholecalciferol, vitamin D3, 1,250 mcg (50,000 unit) capsule Take 50,000 Units by mouth every 7 days.      cloNIDine (CATAPRES) 0.1 MG tablet Take 0.1 mg by mouth 2 (two) times daily.      fenofibrate (TRICOR) 145 MG tablet Take 145 mg by mouth.      glimepiride (AMARYL) 1 MG tablet Take 1 mg by mouth every morning.      JANUVIA 100 mg Tab Take 100 mg by mouth.      JARDIANCE 25 mg tablet Take 25 mg by mouth.      lisinopriL 10 MG tablet Take 10 mg by mouth.      metFORMIN (GLUCOPHAGE) 500 MG tablet Take 500 mg by mouth.      rosuvastatin (CRESTOR) 10 MG tablet Take 10 mg by mouth.      tamsulosin (FLOMAX) 0.4 mg Cap Take 1 capsule by mouth.       No current facility-administered medications on file prior to visit.

## 2025-04-29 NOTE — Clinical Note
Orders for 04/29/2025:  Bone marrow aspiration and core biopsy  Flow cytometry of blood (leukemia/lymphoma panel) Follow-up in 3 weeks

## 2025-05-05 ENCOUNTER — LAB VISIT (OUTPATIENT)
Dept: HEMATOLOGY/ONCOLOGY | Facility: CLINIC | Age: 45
End: 2025-05-05
Payer: MEDICAID

## 2025-05-05 DIAGNOSIS — D72.829 LEUKOCYTOSIS, UNSPECIFIED TYPE: ICD-10-CM

## 2025-05-05 DIAGNOSIS — D75.1 ERYTHROCYTOSIS: ICD-10-CM

## 2025-05-05 PROCEDURE — 36415 COLL VENOUS BLD VENIPUNCTURE: CPT | Performed by: INTERNAL MEDICINE

## 2025-05-05 PROCEDURE — 88184 FLOWCYTOMETRY/ TC 1 MARKER: CPT | Performed by: INTERNAL MEDICINE

## 2025-05-05 PROCEDURE — 88185 FLOWCYTOMETRY/TC ADD-ON: CPT

## 2025-05-07 DIAGNOSIS — K76.0 HEPATIC STEATOSIS: Primary | ICD-10-CM

## 2025-05-07 DIAGNOSIS — R16.0 HEPATOMEGALY: ICD-10-CM

## 2025-05-07 DIAGNOSIS — D72.829 LEUKOCYTOSIS, UNSPECIFIED TYPE: ICD-10-CM

## 2025-05-07 LAB
FLOW CYTOMETRY SPECIALIST REVIEW: NORMAL
M REASON FOR REFERRAL: NORMAL
PATH REPORT.FINAL DX SPEC: NORMAL
PATH REPORT.MICROSCOPIC SPEC OTHER STN: NORMAL
RELEVANT DIAGNOSTIC TESTS/LABORATORY DATA NOTE: NORMAL
SPECIMEN SOURCE: NORMAL

## 2025-05-09 ENCOUNTER — TELEPHONE (OUTPATIENT)
Dept: ENDOSCOPY | Facility: HOSPITAL | Age: 45
End: 2025-05-09
Payer: MEDICAID

## 2025-05-09 DIAGNOSIS — D72.829 LEUKOCYTOSIS, UNSPECIFIED TYPE: Primary | ICD-10-CM

## 2025-05-09 NOTE — TELEPHONE ENCOUNTER
Patient contacted per requested of Dr. Gonzales to rescheduled patient's procedure in Interventional Radiology. Patient verbalized understanding. Referral placed to IR.

## 2025-05-12 ENCOUNTER — TELEPHONE (OUTPATIENT)
Dept: INTERVENTIONAL RADIOLOGY/VASCULAR | Facility: HOSPITAL | Age: 45
End: 2025-05-12
Payer: MEDICAID

## 2025-05-12 DIAGNOSIS — Z01.818 PREOP TESTING: Primary | ICD-10-CM

## 2025-05-12 NOTE — TELEPHONE ENCOUNTER
Patient called and IR Bone Marrow Biopsy scheduled and confirmed for Thursday, 5/12, at 12 pm. The patient is aware that this is a fasting procedure and fasting labs are required prior to the biopsy.

## 2025-05-13 ENCOUNTER — LAB VISIT (OUTPATIENT)
Dept: LAB | Facility: HOSPITAL | Age: 45
End: 2025-05-13
Attending: PATHOLOGY
Payer: MEDICAID

## 2025-05-13 DIAGNOSIS — Z01.818 PREOP TESTING: ICD-10-CM

## 2025-05-13 LAB
ALBUMIN SERPL-MCNC: 3.9 G/DL (ref 3.5–5)
ALBUMIN/GLOB SERPL: 0.9 RATIO (ref 1.1–2)
ALP SERPL-CCNC: 84 UNIT/L (ref 40–150)
ALT SERPL-CCNC: 19 UNIT/L (ref 0–55)
ANION GAP SERPL CALC-SCNC: 9 MEQ/L
AST SERPL-CCNC: 12 UNIT/L (ref 11–45)
BASOPHILS # BLD AUTO: 0.08 X10(3)/MCL
BASOPHILS NFR BLD AUTO: 0.6 %
BILIRUB SERPL-MCNC: 0.4 MG/DL
BUN SERPL-MCNC: 16 MG/DL (ref 8.9–20.6)
CALCIUM SERPL-MCNC: 9.4 MG/DL (ref 8.4–10.2)
CHLORIDE SERPL-SCNC: 110 MMOL/L (ref 98–107)
CO2 SERPL-SCNC: 22 MMOL/L (ref 22–29)
CREAT SERPL-MCNC: 0.7 MG/DL (ref 0.72–1.25)
CREAT/UREA NIT SERPL: 23
EOSINOPHIL # BLD AUTO: 0.54 X10(3)/MCL (ref 0–0.9)
EOSINOPHIL NFR BLD AUTO: 3.9 %
ERYTHROCYTE [DISTWIDTH] IN BLOOD BY AUTOMATED COUNT: 12.1 % (ref 11.5–17)
GFR SERPLBLD CREATININE-BSD FMLA CKD-EPI: >60 ML/MIN/1.73/M2
GLOBULIN SER-MCNC: 4.2 GM/DL (ref 2.4–3.5)
GLUCOSE SERPL-MCNC: 140 MG/DL (ref 74–100)
HCT VFR BLD AUTO: 52 % (ref 42–52)
HGB BLD-MCNC: 17.5 G/DL (ref 14–18)
IMM GRANULOCYTES # BLD AUTO: 0.09 X10(3)/MCL (ref 0–0.04)
IMM GRANULOCYTES NFR BLD AUTO: 0.6 %
INR PPP: 0.9
LYMPHOCYTES # BLD AUTO: 3.75 X10(3)/MCL (ref 0.6–4.6)
LYMPHOCYTES NFR BLD AUTO: 26.8 %
MCH RBC QN AUTO: 30 PG (ref 27–31)
MCHC RBC AUTO-ENTMCNC: 33.7 G/DL (ref 33–36)
MCV RBC AUTO: 89.2 FL (ref 80–94)
MONOCYTES # BLD AUTO: 0.97 X10(3)/MCL (ref 0.1–1.3)
MONOCYTES NFR BLD AUTO: 6.9 %
NEUTROPHILS # BLD AUTO: 8.55 X10(3)/MCL (ref 2.1–9.2)
NEUTROPHILS NFR BLD AUTO: 61.2 %
NRBC BLD AUTO-RTO: 0 %
PLATELET # BLD AUTO: 328 X10(3)/MCL (ref 130–400)
PMV BLD AUTO: 10.4 FL (ref 7.4–10.4)
POTASSIUM SERPL-SCNC: 4 MMOL/L (ref 3.5–5.1)
PROT SERPL-MCNC: 8.1 GM/DL (ref 6.4–8.3)
PROTHROMBIN TIME: 12.3 SECONDS (ref 11.4–14)
RBC # BLD AUTO: 5.83 X10(6)/MCL (ref 4.7–6.1)
SODIUM SERPL-SCNC: 141 MMOL/L (ref 136–145)
WBC # BLD AUTO: 13.98 X10(3)/MCL (ref 4.5–11.5)

## 2025-05-13 PROCEDURE — 80053 COMPREHEN METABOLIC PANEL: CPT

## 2025-05-13 PROCEDURE — 85025 COMPLETE CBC W/AUTO DIFF WBC: CPT

## 2025-05-13 PROCEDURE — 36415 COLL VENOUS BLD VENIPUNCTURE: CPT

## 2025-05-13 PROCEDURE — 85610 PROTHROMBIN TIME: CPT

## 2025-05-15 ENCOUNTER — HOSPITAL ENCOUNTER (OUTPATIENT)
Dept: INTERVENTIONAL RADIOLOGY/VASCULAR | Facility: HOSPITAL | Age: 45
Discharge: HOME | End: 2025-05-15
Attending: INTERNAL MEDICINE
Payer: MEDICAID

## 2025-05-15 DIAGNOSIS — D72.829 LEUCOCYTOSIS: ICD-10-CM

## 2025-05-15 DIAGNOSIS — D72.829 LEUKOCYTOSIS, UNSPECIFIED TYPE: ICD-10-CM

## 2025-05-15 PROCEDURE — 63600175 PHARM REV CODE 636 W HCPCS: Performed by: RADIOLOGY

## 2025-05-15 RX ORDER — SODIUM CHLORIDE 0.9 % (FLUSH) 0.9 %
10 SYRINGE (ML) INJECTION
Status: DISCONTINUED | OUTPATIENT
Start: 2025-05-15 | End: 2025-05-16 | Stop reason: HOSPADM

## 2025-05-15 RX ORDER — LIDOCAINE HYDROCHLORIDE 10 MG/ML
INJECTION, SOLUTION INFILTRATION; PERINEURAL
Status: COMPLETED | OUTPATIENT
Start: 2025-05-15 | End: 2025-05-15

## 2025-05-15 RX ORDER — FENTANYL CITRATE 50 UG/ML
INJECTION, SOLUTION INTRAMUSCULAR; INTRAVENOUS
Status: COMPLETED | OUTPATIENT
Start: 2025-05-15 | End: 2025-05-15

## 2025-05-15 RX ORDER — MIDAZOLAM HYDROCHLORIDE 2 MG/2ML
INJECTION, SOLUTION INTRAMUSCULAR; INTRAVENOUS
Status: COMPLETED | OUTPATIENT
Start: 2025-05-15 | End: 2025-05-15

## 2025-05-15 RX ADMIN — FENTANYL CITRATE 25 MCG: 50 INJECTION INTRAMUSCULAR; INTRAVENOUS at 12:05

## 2025-05-15 RX ADMIN — LIDOCAINE HYDROCHLORIDE 5 ML: 10 INJECTION, SOLUTION INFILTRATION; PERINEURAL at 12:05

## 2025-05-15 RX ADMIN — MIDAZOLAM HYDROCHLORIDE 1 MG: 1 INJECTION, SOLUTION INTRAMUSCULAR; INTRAVENOUS at 12:05

## 2025-05-15 NOTE — INTERVAL H&P NOTE
The patient has been examined and the H&P has been reviewed:    I concur with the findings and no changes have occurred since H&P was written. Arturo Forrester is a 45 y.o. male with Erythrocytosis who presents for Bone Marrow Biopsy.           There are no hospital problems to display for this patient.

## 2025-05-15 NOTE — DISCHARGE INSTRUCTIONS
`Take it easy for the next 24 hours since you have had anesthesia sedation.      `Resume home medications unless otherwise instructed by your doctor.     ` Be sure to stay hydrated.      `No bending, straining or or heavy lifting over 15 pounds for 1 week.      `You may remove your bandaid in 24 hours and shower then.      `No bathing, soaking or swimming for 5 days    `Notify MD if you are running fever over 100.4, see any reddness or foul smelling discharge from biopsy area.      `Go to your nearest ER or call 911 if you have any difficulty breathing severe pain or notice swelling, bruising or bleeding in your right side. Thank you for choosing [facility] for your care and it was my pleasure taking care of you.  [phone]

## 2025-05-15 NOTE — DISCHARGE SUMMARY
Radiology Post-Procedure Note    Pre Op Diagnosis: Erythrocytosis    Post Op Diagnosis: Same    Secondary Diagnoses:   Problem List Items Addressed This Visit       Leukocytosis    Relevant Orders    IR Dx Bone Marrow Biopsy(IES) w/Aspiration, Right (xpd)     Other Visit Diagnoses         Leucocytosis        Relevant Orders    IR Dx Bone Marrow Biopsy(IES) w/Aspiration, Right (xpd)             Procedure: CT Guided Right Iliac Bone Marrow Biopsy    Procedure performed by: Flynn Dawkins MD    Assistant: None    Written Informed Consent Obtained: Yes    Specimen Removed: 5 cc aspirated marrow & Bone core    Estimated Blood Loss: <10 cc    Condition: Stable    Outcome: The patient tolerated the procedure well and was without complications.    For further details please see the imaging report associated.    Disposition: Home or Self Care    Follow Up: With primary care provider    Discharge Instructions:  No discharge procedures on file.       Time Spent On Discharge: 2 minutes

## 2025-05-16 VITALS
HEART RATE: 83 BPM | SYSTOLIC BLOOD PRESSURE: 122 MMHG | WEIGHT: 245.81 LBS | DIASTOLIC BLOOD PRESSURE: 78 MMHG | BODY MASS INDEX: 40.9 KG/M2 | OXYGEN SATURATION: 98 % | RESPIRATION RATE: 16 BRPM | TEMPERATURE: 98 F

## 2025-05-22 ENCOUNTER — OFFICE VISIT (OUTPATIENT)
Dept: HEMATOLOGY/ONCOLOGY | Facility: CLINIC | Age: 45
End: 2025-05-22
Attending: INTERNAL MEDICINE
Payer: MEDICAID

## 2025-05-22 VITALS
OXYGEN SATURATION: 95 % | BODY MASS INDEX: 37.77 KG/M2 | HEIGHT: 69 IN | TEMPERATURE: 98 F | HEART RATE: 103 BPM | WEIGHT: 255 LBS | SYSTOLIC BLOOD PRESSURE: 133 MMHG | RESPIRATION RATE: 15 BRPM | DIASTOLIC BLOOD PRESSURE: 75 MMHG

## 2025-05-22 DIAGNOSIS — Z87.891 HISTORY OF SMOKING: ICD-10-CM

## 2025-05-22 DIAGNOSIS — K76.0 HEPATIC STEATOSIS: ICD-10-CM

## 2025-05-22 DIAGNOSIS — F15.11 HISTORY OF METHAMPHETAMINE ABUSE: ICD-10-CM

## 2025-05-22 DIAGNOSIS — F10.11 HISTORY OF ALCOHOL ABUSE: Primary | ICD-10-CM

## 2025-05-22 DIAGNOSIS — K52.9 CHRONIC DIARRHEA: ICD-10-CM

## 2025-05-22 DIAGNOSIS — F12.91 HISTORY OF MARIJUANA USE: ICD-10-CM

## 2025-05-22 DIAGNOSIS — D72.829 LEUKOCYTOSIS, UNSPECIFIED TYPE: ICD-10-CM

## 2025-05-22 DIAGNOSIS — D75.1 ERYTHROCYTOSIS: ICD-10-CM

## 2025-05-22 DIAGNOSIS — Z86.19 HISTORY OF HEPATITIS C: ICD-10-CM

## 2025-05-22 PROCEDURE — 3008F BODY MASS INDEX DOCD: CPT | Mod: CPTII,,, | Performed by: INTERNAL MEDICINE

## 2025-05-22 PROCEDURE — 3075F SYST BP GE 130 - 139MM HG: CPT | Mod: CPTII,,, | Performed by: INTERNAL MEDICINE

## 2025-05-22 PROCEDURE — 1160F RVW MEDS BY RX/DR IN RCRD: CPT | Mod: CPTII,,, | Performed by: INTERNAL MEDICINE

## 2025-05-22 PROCEDURE — 1159F MED LIST DOCD IN RCRD: CPT | Mod: CPTII,,, | Performed by: INTERNAL MEDICINE

## 2025-05-22 PROCEDURE — 4010F ACE/ARB THERAPY RXD/TAKEN: CPT | Mod: CPTII,,, | Performed by: INTERNAL MEDICINE

## 2025-05-22 PROCEDURE — 3078F DIAST BP <80 MM HG: CPT | Mod: CPTII,,, | Performed by: INTERNAL MEDICINE

## 2025-05-22 PROCEDURE — 99214 OFFICE O/P EST MOD 30 MIN: CPT | Mod: S$PBB,,, | Performed by: INTERNAL MEDICINE

## 2025-05-22 PROCEDURE — 99214 OFFICE O/P EST MOD 30 MIN: CPT | Mod: PBBFAC | Performed by: INTERNAL MEDICINE

## 2025-05-22 NOTE — Clinical Note
Orders for 05/22/2025  Follow-up in 3 weeks when the results of NGS testing on bone marrow biopsy, are available  Start baby aspirin 81 mg daily Refer to GI for evaluation of hepatic steatosis and hepatomegaly Follow-up in 3 weeks

## 2025-05-22 NOTE — PROGRESS NOTES
History:  Past Medical History:   Diagnosis Date    Diabetes     Essential (primary) hypertension     Hepatic steatosis     History of drug abuse     History of hepatitis C     Treated    HLD (hyperlipidemia)        Past Surgical History:   Procedure Laterality Date    COLONOSCOPY      TONSILLECTOMY        Social History     Socioeconomic History    Marital status: Single   Tobacco Use    Smoking status: Every Day     Types: Cigarettes    Smokeless tobacco: Never    Tobacco comments:     About 1/2 pack per day   Substance and Sexual Activity    Alcohol use: Yes     Comment: 3 times a year    Drug use: Not Currently     Types: Methamphetamines, Marijuana, Cocaine     Comment: has not used in years      Family History   Problem Relation Name Age of Onset    Diabetes Father      Hypertension Father      Skin cancer Maternal Grandfather      Skin cancer Paternal Grandfather        Reason for Follow-up:  Erythrocytosis  Mild leukocytosis  History of hep C, treated  Hepatic steatosis, hepatomegaly  Chronic diarrhea    History of Present Illness:   Follow-up (Patient reports no new concerns as of today.)      Oncologic/Hematologic History:  Oncology History    No history exists.   Past medical history:  NIDDM; essential hypertension; transaminitis; history of hep C (treated by a GI in [place], 3 years ago); COVID-19 (brief hospitalization 02/07/2023-02/08/2023); dyslipidemia; hypovitaminosis D; low back pain; lymphocytosis; history of smoking; history of alcohol abuse; history of marijuana, methamphetamines, cocaine abuse  Procedure/surgical history:  Tonsillectomy 1987; left elbow fracture requiring cost 1989; left forearm fracture requiring gas 1995; right hand fracture 1997; uvula mass excision 2021  Social history: Lives in [place].  Has a girlfriend.  To children who are grown and gone.  Currently, not working.  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  Says that he has not worked in [employer]  years.  Says that the reason quit working, was because he has 5-20 loose bowel movements daily, probably as a side effect of medication that he is taking.  History of smoking: Smoked 1-2 ppd X 20 years, now down to 5-10 cigarettes daily x2 years; in his younger years, was exposed to secondhand smoke from his mother and grandparents  History of alcohol abuse: Used to drink 1 or 230 pack beer daily; drank for 8 years; lately, down to 2-3 beers yearly  History of substance Abuse:  Use marijuana, methamphetamine, and cocaine for several years; quit 5-6 years ago  Family history:  Both grandparents experienced melanoma of skin.  Health maintenance:  PCP in [place]  -02/27/2025: Says that he had screening colonoscopy done in [place] in January 2025, and that it was normal; did show small hemorrhoids; apparently, no polyps or cancer      44-year-old gentleman, referred by Maximiliano Parra MD, [facility], for evaluation of leukocytosis.    Investigations reviewed:  -10/31/2023:  WBC 12.6 minimally elevated, hemoglobin 17.5 elevated, hematocrit 51.6 elevated, MCV normal, platelets 350 K normal, neutrophils 62%, lymphocytes 25%, monocytes 7%  -01/30/2024:  WBC 11.9 minimally elevated, hemoglobin 18.2 elevated, hematocrit 54.5 elevated, MCV normal, platelets 330 K, neutrophils 63%, lymphocytes 25%  -05/14/2024:  WBC 13.3 minimally elevated, hemoglobin 17.7, hematocrit 52.4, MCV normal, platelets 319 K, neutrophils 64%, lymphocytes 24%, ANC 9.0 K, ALC 3.3 K  -05/23/2024: WBC 11.8 minimally elevated, hemoglobin 17.4, hematocrit 51.2, MCV normal, platelets 306 K, neutrophils 60%, lymphocytes 27%, monocytes 7%, eosinophils 4%, ANC 7.3 K, lymphocytes 3.2 K eosinophils 0.5 K  -08/14/2024: WBC 12.7, hemoglobin 17.1, hematocrit 50.4, MCV normal, platelets 323 K, neutrophils 58%, lymphocytes 28%, monocytes 8%, eosinophils 4%, basophils 1%  -11/14/2024: WBC 13.1 slightly elevated, hemoglobin 17.6, hematocrit 52.3,  MCV normal, platelets 329 K, neutrophils 63%, lymphocytes 25%, monocytes 7%, eosinophils 3%, basophils 1%  -02/14/2025:  WBC 12.7 K, hemoglobin 17.2, hematocrit 51.2, MCV normal, platelets 343 K, neutrophils 60%, lymphocytes 26%, monocytes 7%, eosinophils 3%, basophils 1%    -10/31/2023: CMP unremarkable  -01/30/2024:  CMP unremarkable  -05/14/2024: CMP unremarkable  -05/23/2024: CMP unremarkable      02/27/2025:  Pleasant, healthy-appearing gentleman who presents for initial hematology consultation.  In no acute discomfort.    Chronic smoker.  Chronic alcoholic.  Has cut down on smoking.  Has cut down on alcohol.  Has quit marijuana, methamphetamine, and cocaine.  No aquagenic pruritus.  However, always has itching.  No erythromelalgia.  No history of fevers, chills, night sweats, anorexia, unintentional weight loss, or bone pains.  Chronic weakness and fatigue.  However, ECOG 0.  Appetite is okay.  No history of blood clots.  No history of DVT or pulmonary embolism.  No history of blood clots in unusual locations like portal vein, splenic vein, cerebral veins, etc..  No history of malignancy.  No history of exposure to carbon monoxide.  Not exposed to automobile exhausted.  Not exposed to [place] or fumes.  Does not work enclosed spaces, exposed to weekly exon cysts.  No history of COPD that he is aware of.  Denies chronic dyspnea.  No heart problems.  No history of hereditary erythrocytosis.  No history of CKD/renal artery stenosis/renal cysts/hydronephrosis.  No history of adrenal tumors.  No history of cerebellar hemangiomas, hepatomas, renal cell carcinoma, or pheochromocytoma.  No history of blood dropping.  Does not use anabolic steroids.  No chest pains, abdominal pains, myalgias, weakness, fatigue, headache, blurred vision, transient loss of vision, paresthesias, slow mentation, or sense of depersonalization.  No history of LAILA.  No daytime sleepiness.  No history of cyanotic heart disease.  No history of  intracardiac or intrapulmonary shunts.  No history of mucocutaneous telangiectasias.  Does not self inject erythropoiesis stimulating agents.    Interval History:  [No matching plan found]   [No matching plan found]     05/22/2025:   -05/13/2025: WBC 13.98, hemoglobin 17.5, hematocrit 52.0, platelets 328 K, differential count normal, CMP reviewed  -05/05/2025: Flow cytometry of blood:  Normal IHC results; no monotypic B-cell population or increase in blasts identified  -05/15/2025: Bone marrow aspiration and core biopsy:  Hypercellular bone marrow; trilineage hematopoiesis with maturation; no significant dysplasia; flow cytometry negative for significant immunophenotypic abnormalities; markedly decreased iron stores; bone marrow cellularity 70-80%; megakaryocytes are normal in # and show maturation; myeloid and lymphoid cell population are normal in # and show maturation; no increase in reticulin fibrosis  (NGS testing is pending)  Presents for a follow-up visit.  Overall, doing well.  No new symptoms.  Great appetite.  No lethargy, weakness, fatigue, fevers, drenching night sweats, aquagenic pruritus, erythromelalgia, epistaxis, etc..  No chest pain, abdominal pain, myalgias, weakness, fatigue, headache, blurred vision, transient loss of vision, paresthesias, slow mentation, or sense of depersonalization.  No history of LAILA.  No daytime sleepiness.  No history of cyanotic heart disease.  No history of intracardiac or intrapulmonary shunts.  No history of mucocutaneous angioectasias.  ECOG 0. History of DVT, PE, or blood clots in unusual locations like portal vein, splenic vein, cerebral veins, etc..  No history of malignancy.  No exposure to carbon monoxide.  Smoked 1-2 ppd X 20 years, now down to 5-10 cigarettes daily x2 years; in his younger years, was exposed to secondhand smoke from his mother and grandparents.    Allergies as of 05/22/2025    (No Known Allergies)     Medications:  Medications Ordered Prior to  Encounter[1]    Review of Systems:   All systems reviewed and found to be negative except for the symptoms detailed above    Physical Examination:   VITAL SIGNS:   Vitals:    05/22/25 1524   BP: 133/75   Pulse: 103   Resp: 15   Temp: 98.4 °F (36.9 °C)       GENERAL:  In no apparent distress.    HEAD:  No signs of head trauma.  EYES:  Pupils are equal.  Extraocular motions intact.    EARS:  Hearing grossly intact.  MOUTH:  Oropharynx is normal.   NECK:  No adenopathy, no JVD.     CHEST:  Chest with clear breath sounds bilaterally.  No wheezes, rales, rhonchi.    CARDIAC:  Regular rate and rhythm.  S1 and S2, without murmurs, gallops, rubs.  VASCULAR:  No Edema.  Peripheral pulses normal and equal in all extremities.  ABDOMEN:  Soft, without detectable tenderness.  No sign of distention.  No   rebound or guarding, and no masses palpated.   Bowel Sounds normal.  MUSCULOSKELETAL:  Good range of motion of all major joints. Extremities without clubbing, cyanosis or edema.    NEUROLOGIC EXAM:  Alert and oriented x 3.  No focal sensory or strength deficits.   Speech normal.  Follows commands.  PSYCHIATRIC:  Mood normal.    No results found for this or any previous visit.  No results found for this or any previous visit.    Assessment:  Problem List Items Addressed This Visit       Erythrocytosis    Leukocytosis    History of hepatitis C    Chronic diarrhea    History of smoking    History of alcohol abuse - Primary    History of methamphetamine abuse    History of marijuana use    Hepatic steatosis     Orders for 05/22/2025   Follow-up in 3 weeks when the results of NGS testing on bone marrow biopsy, are available   Start baby aspirin 81 mg daily  Refer to GI for evaluation of hepatic steatosis and hepatomegaly  Follow-up in 3 weeks    Above discussed with the patient.  All questions answered.   Discussed labs and plan of management.   He understands and agrees with this plan.  ===================================    #  Erythrocytosis:     -10/31/2023:  WBC 12.6 minimally elevated, hemoglobin 17.5 elevated, hematocrit 51.6 elevated, MCV normal, platelets 350 K normal, neutrophils 62%, lymphocytes 25%, monocytes 7%  -01/30/2024:  WBC 11.9 minimally elevated, hemoglobin 18.2 elevated, hematocrit 54.5 elevated, MCV normal, platelets 330 K, neutrophils 63%, lymphocytes 25%  -05/14/2024:  WBC 13.3 minimally elevated, hemoglobin 17.7, hematocrit 52.4, MCV normal, platelets 319 K, neutrophils 64%, lymphocytes 24%, ANC 9.0 K, ALC 3.3 K  -05/23/2024: WBC 11.8 minimally elevated, hemoglobin 17.4, hematocrit 51.2, MCV normal, platelets 306 K, neutrophils 60%, lymphocytes 27%, monocytes 7%, eosinophils 4%, ANC 7.3 K, lymphocytes 3.2 K eosinophils 0.5 K  -08/14/2024: WBC 12.7, hemoglobin 17.1, hematocrit 50.4, MCV normal, platelets 323 K, neutrophils 58%, lymphocytes 28%, monocytes 8%, eosinophils 4%, basophils 1%  -11/14/2024: WBC 13.1 slightly elevated, hemoglobin 17.6, hematocrit 52.3, MCV normal, platelets 329 K, neutrophils 63%, lymphocytes 25%, monocytes 7%, eosinophils 3%, basophils 1%  -02/14/2025:  WBC 12.7 K, hemoglobin 17.2, hematocrit 51.2, MCV normal, platelets 343 K, neutrophils 60%, lymphocytes 26%, monocytes 7%, eosinophils 3%, basophils 1%  -History of smoking: Smoked 1-2 ppd X 20 years, now down to 5-10 cigarettes daily x2 years; in his younger years, was exposed to secondhand smoke from his mother and grandparents  -History of alcohol abuse: Used to drink 1 or 2 30 pack beer daily; drank for 8 years; lately, down to 2 beers daily  -History of substance Abuse:  Use marijuana, methamphetamine, and cocaine for several years; quit 5-6 years ago  -02/27/2025: Room air pulse oximetry:  96% at rest, normal; 95% after physical activity, normal  -02/27/2025:  Carboxyhemoglobin 2.2%, normal  -02/27/2025:  Peripheral blood:  JAK2 V617F mutation negative; JAK2 exon 12 mutation negative; JAK2 exon 13 mutation negative; MPL mutation  negative; CALR mutation negative  -02/27/2025:  Peripheral blood: BCR-ABL1 fusion transcript negative  -02/27/2025:  WBC 16.48; hemoglobin 17.5; hematocrit 50.3; platelets normal; differential count unremarkable  -02/27/2025:  CMP reviewed, essentially unremarkable  -02/27/2025: AFP level 2.0 normal  02/27/2025:  Erythropoietin level 8.8 normal (reference Range:  2.6-18.5 mIU /ml)  -03/11/2025:  Complete abdominal ultrasound: Hepatic enlargement with steatosis; liver measures 23.4 cm; spleen measures 14.6 cm  -05/13/2025: WBC 13.98, hemoglobin 17.5, hematocrit 52.0, platelets 328 K, differential count normal, CMP reviewed  -05/05/2025: Flow cytometry of blood:  Normal IHC results; no monotypic B-cell population or increase in blasts identified  -05/15/2025: Bone marrow aspiration and core biopsy:  Hypercellular bone marrow; trilineage hematopoiesis with maturation; no significant dysplasia; flow cytometry negative for significant immunophenotypic abnormalities; markedly decreased iron stores; bone marrow cellularity 70-80%; megakaryocytes are normal in # and show maturation; myeloid and lymphoid cell population are normal in # and show maturation; no increase in reticulin fibrosis  (NGS testing is pending)  >>>  Plan:  Chronic erythrocytosis   JAK2, MPL, CALR mutations negative  BCR-ABL1 fusion transcript negative  Mild, neutrophilic leukocytosis as well  History of smoking, has cut down   History of alcohol abuse, has cut down  History of substance abuse, has quit  Room air pulse oximetry normal   Carboxyhemoglobin level is not elevated  Erythropoietin level is not sub normal but appears to be suppressed, 8.8, despite erythrocytosis  Bone marrow biopsy report as above (NGS report pending)  Abdominal ultrasound shows hepatomegaly and hepatic steatosis  AFP level normal on 02/27/2025  Have referred to GI for evaluation of hepatic steatosis and hepatomegaly  Has underlying cardiovascular risk factors) NIDDM,  hypertension, smoking history); therefore, even if secondary erythrocytosis, we will be reasonable for him to take baby aspirin daily  At this time, no indication of therapeutic phlebotomy treatments  At this time, in the absence of confirmed diagnosis of polycythemia vera, no indication of cytoreductive therapy, either.    Erythrocytosis can be secondary to chronic tobacco abuse, causing a state of hypoxia; strongly advised to quit smoking  He is taking Jardiance [Empagliflozin, a Sodium-Glucose Cotransporter 2 (SGLT2) Inhibitor]  SGLT2 (sodium-glucose at-mxxhnmaalzu-4) inhibitors are known to cause erythrocytosis  Chronic watery diarrhea; up to 5-20 loose stools daily; he attributes this to side effects of medications that he is taking; this can create a state of hypovolemia which can spuriously elevated H/H  Absence of MPN associated mutations renders the possibility of polycythemia vera, remote (however, NGS testing on bone marrow biopsy, is pending as of 05/22/2025)    Follow-up in 3 weeks, when the results of bone marrow NGS testing are available    Above discussed at length with the patient.  All questions answered.    Discussed labs, differential diagnosis, and plan of management.    He understands and agrees with this plan.    Follow-up:  No follow-ups on file.               [1]   Current Outpatient Medications on File Prior to Visit   Medication Sig Dispense Refill    amLODIPine (NORVASC) 10 MG tablet Take 10 mg by mouth.      cholecalciferol, vitamin D3, 1,250 mcg (50,000 unit) capsule Take 50,000 Units by mouth every 7 days.      cloNIDine (CATAPRES) 0.1 MG tablet Take 0.1 mg by mouth 2 (two) times daily.      fenofibrate (TRICOR) 145 MG tablet Take 145 mg by mouth.      glimepiride (AMARYL) 1 MG tablet Take 1 mg by mouth every morning.      JANUVIA 100 mg Tab Take 100 mg by mouth.      JARDIANCE 25 mg tablet Take 25 mg by mouth.      lisinopriL 10 MG tablet Take 10 mg by mouth.      metFORMIN  (GLUCOPHAGE) 500 MG tablet Take 500 mg by mouth.      rosuvastatin (CRESTOR) 10 MG tablet Take 10 mg by mouth.      tamsulosin (FLOMAX) 0.4 mg Cap Take 1 capsule by mouth.       No current facility-administered medications on file prior to visit.

## 2025-06-05 ENCOUNTER — DOCUMENTATION ONLY (OUTPATIENT)
Dept: HEMATOLOGY/ONCOLOGY | Facility: CLINIC | Age: 45
End: 2025-06-05
Payer: MEDICAID

## 2025-06-24 PROBLEM — D75.1 SECONDARY ERYTHROCYTOSIS: Status: ACTIVE | Noted: 2025-06-24

## 2025-06-25 ENCOUNTER — INFUSION (OUTPATIENT)
Dept: INFUSION THERAPY | Facility: HOSPITAL | Age: 45
End: 2025-06-25
Attending: INTERNAL MEDICINE
Payer: MEDICAID

## 2025-06-25 ENCOUNTER — OFFICE VISIT (OUTPATIENT)
Dept: HEMATOLOGY/ONCOLOGY | Facility: CLINIC | Age: 45
End: 2025-06-25
Attending: INTERNAL MEDICINE
Payer: MEDICAID

## 2025-06-25 VITALS
BODY MASS INDEX: 37.78 KG/M2 | DIASTOLIC BLOOD PRESSURE: 79 MMHG | TEMPERATURE: 98 F | SYSTOLIC BLOOD PRESSURE: 147 MMHG | OXYGEN SATURATION: 96 % | RESPIRATION RATE: 18 BRPM | HEART RATE: 95 BPM | WEIGHT: 255.81 LBS

## 2025-06-25 DIAGNOSIS — Z87.891 HISTORY OF SMOKING: ICD-10-CM

## 2025-06-25 DIAGNOSIS — R16.0 HEPATOMEGALY: ICD-10-CM

## 2025-06-25 DIAGNOSIS — F12.91 HISTORY OF MARIJUANA USE: ICD-10-CM

## 2025-06-25 DIAGNOSIS — K52.9 CHRONIC DIARRHEA: ICD-10-CM

## 2025-06-25 DIAGNOSIS — K76.0 HEPATIC STEATOSIS: ICD-10-CM

## 2025-06-25 DIAGNOSIS — F14.11 HISTORY OF COCAINE ABUSE: ICD-10-CM

## 2025-06-25 DIAGNOSIS — F10.11 HISTORY OF ALCOHOL ABUSE: Primary | ICD-10-CM

## 2025-06-25 DIAGNOSIS — D75.1 ERYTHROCYTOSIS: Primary | ICD-10-CM

## 2025-06-25 DIAGNOSIS — D72.829 LEUKOCYTOSIS, UNSPECIFIED TYPE: ICD-10-CM

## 2025-06-25 DIAGNOSIS — D75.1 ERYTHROCYTOSIS: ICD-10-CM

## 2025-06-25 DIAGNOSIS — T38.3X5A ADVERSE EFFECT OF SODIUM-GLUCOSE COTRANSPORTER 2 (SGLT2) INHIBITOR: ICD-10-CM

## 2025-06-25 DIAGNOSIS — D75.1 SECONDARY ERYTHROCYTOSIS: ICD-10-CM

## 2025-06-25 DIAGNOSIS — Z86.19 HISTORY OF HEPATITIS C: ICD-10-CM

## 2025-06-25 DIAGNOSIS — F15.11 HISTORY OF METHAMPHETAMINE ABUSE: ICD-10-CM

## 2025-06-25 LAB
BASOPHILS # BLD AUTO: 0.06 X10(3)/MCL
BASOPHILS NFR BLD AUTO: 0.4 %
EOSINOPHIL # BLD AUTO: 0.49 X10(3)/MCL (ref 0–0.9)
EOSINOPHIL NFR BLD AUTO: 3.4 %
ERYTHROCYTE [DISTWIDTH] IN BLOOD BY AUTOMATED COUNT: 12.4 % (ref 11.5–17)
HCT VFR BLD AUTO: 51.4 % (ref 42–52)
HGB BLD-MCNC: 17.5 G/DL (ref 14–18)
IMM GRANULOCYTES # BLD AUTO: 0.11 X10(3)/MCL (ref 0–0.04)
IMM GRANULOCYTES NFR BLD AUTO: 0.8 %
LYMPHOCYTES # BLD AUTO: 3.11 X10(3)/MCL (ref 0.6–4.6)
LYMPHOCYTES NFR BLD AUTO: 21.7 %
MCH RBC QN AUTO: 30.9 PG (ref 27–31)
MCHC RBC AUTO-ENTMCNC: 34 G/DL (ref 33–36)
MCV RBC AUTO: 90.7 FL (ref 80–94)
MONOCYTES # BLD AUTO: 1 X10(3)/MCL (ref 0.1–1.3)
MONOCYTES NFR BLD AUTO: 7 %
NEUTROPHILS # BLD AUTO: 9.56 X10(3)/MCL (ref 2.1–9.2)
NEUTROPHILS NFR BLD AUTO: 66.7 %
NRBC BLD AUTO-RTO: 0 %
PLATELET # BLD AUTO: 311 X10(3)/MCL (ref 130–400)
PMV BLD AUTO: 10 FL (ref 7.4–10.4)
RBC # BLD AUTO: 5.67 X10(6)/MCL (ref 4.7–6.1)
WBC # BLD AUTO: 14.33 X10(3)/MCL (ref 4.5–11.5)

## 2025-06-25 PROCEDURE — 3078F DIAST BP <80 MM HG: CPT | Mod: CPTII,,, | Performed by: INTERNAL MEDICINE

## 2025-06-25 PROCEDURE — 3077F SYST BP >= 140 MM HG: CPT | Mod: CPTII,,, | Performed by: INTERNAL MEDICINE

## 2025-06-25 PROCEDURE — 99214 OFFICE O/P EST MOD 30 MIN: CPT | Mod: S$PBB,,, | Performed by: INTERNAL MEDICINE

## 2025-06-25 PROCEDURE — 99195 PHLEBOTOMY: CPT

## 2025-06-25 PROCEDURE — 1159F MED LIST DOCD IN RCRD: CPT | Mod: CPTII,,, | Performed by: INTERNAL MEDICINE

## 2025-06-25 PROCEDURE — 85025 COMPLETE CBC W/AUTO DIFF WBC: CPT | Performed by: INTERNAL MEDICINE

## 2025-06-25 PROCEDURE — 1160F RVW MEDS BY RX/DR IN RCRD: CPT | Mod: CPTII,,, | Performed by: INTERNAL MEDICINE

## 2025-06-25 PROCEDURE — 4010F ACE/ARB THERAPY RXD/TAKEN: CPT | Mod: CPTII,,, | Performed by: INTERNAL MEDICINE

## 2025-06-25 PROCEDURE — 3008F BODY MASS INDEX DOCD: CPT | Mod: CPTII,,, | Performed by: INTERNAL MEDICINE

## 2025-06-25 PROCEDURE — 99213 OFFICE O/P EST LOW 20 MIN: CPT | Mod: PBBFAC | Performed by: INTERNAL MEDICINE

## 2025-06-25 RX ORDER — LIDOCAINE HYDROCHLORIDE 10 MG/ML
1 INJECTION, SOLUTION EPIDURAL; INFILTRATION; INTRACAUDAL; PERINEURAL ONCE
Status: CANCELLED | OUTPATIENT
Start: 2025-07-02 | End: 2025-07-02

## 2025-06-25 RX ORDER — LIDOCAINE HYDROCHLORIDE 10 MG/ML
1 INJECTION, SOLUTION EPIDURAL; INFILTRATION; INTRACAUDAL; PERINEURAL ONCE
Status: DISCONTINUED | OUTPATIENT
Start: 2025-06-25 | End: 2025-06-25 | Stop reason: HOSPADM

## 2025-06-25 RX ORDER — SODIUM CHLORIDE 9 MG/ML
INJECTION, SOLUTION INTRAVENOUS CONTINUOUS
Start: 2025-07-02

## 2025-06-25 RX ORDER — SODIUM CHLORIDE 9 MG/ML
INJECTION, SOLUTION INTRAVENOUS CONTINUOUS
Status: DISCONTINUED | OUTPATIENT
Start: 2025-06-25 | End: 2025-06-25 | Stop reason: HOSPADM

## 2025-06-25 NOTE — NURSING
1210 Arrive from Oncology clinic post provider visit for tx 1 Therapeutic Phlebotomy q wk.1213 Hct level 54.4% Phlebotomy started 1217 Phlebotomy ended 250cc's removed given 250cc's NS IV. 1245 Orthostatic v/s 119/77 73 sitting 104/71 standing.

## 2025-06-25 NOTE — Clinical Note
Orders for 06/25/2025:  Baby aspirin 81 mg daily Start therapeutic phlebotomy Removed 150 cc of blood weekly, replaced by equal volume of normal saline Target hematocrit <45 Please check with pathology results of JAK2 mutation, CALR mutation, MPL mutation in bone marrow specimen Refer to GI for evaluation of hepatic steatosis and hepatomegaly Follow-up with NP in 1 month

## 2025-06-25 NOTE — PROGRESS NOTES
History:  Past Medical History:   Diagnosis Date    Diabetes     Essential (primary) hypertension     Hepatic steatosis     History of drug abuse     History of hepatitis C     Treated    HLD (hyperlipidemia)        Past Surgical History:   Procedure Laterality Date    COLONOSCOPY      TONSILLECTOMY        Social History     Socioeconomic History    Marital status: Single   Tobacco Use    Smoking status: Every Day     Types: Cigarettes    Smokeless tobacco: Never    Tobacco comments:     About 1/2 pack per day   Substance and Sexual Activity    Alcohol use: Yes     Comment: 3 times a year    Drug use: Not Currently     Types: Methamphetamines, Marijuana, Cocaine     Comment: has not used in years      Family History   Problem Relation Name Age of Onset    Diabetes Father      Hypertension Father      Skin cancer Maternal Grandfather      Skin cancer Paternal Grandfather        Reason for Follow-up:  Erythrocytosis  Mild leukocytosis  History of hep C, treated  Hepatic steatosis, hepatomegaly  Chronic diarrhea    History of Present Illness:   Erythrocytosis      Oncologic/Hematologic History:  Oncology History    No history exists.   Past medical history:  NIDDM; essential hypertension; transaminitis; history of hep C (treated by a GI in [place], 3 years ago); COVID-19 (brief hospitalization 02/07/2023-02/08/2023); dyslipidemia; hypovitaminosis D; low back pain; lymphocytosis; history of smoking; history of alcohol abuse; history of marijuana, methamphetamines, cocaine abuse  Procedure/surgical history:  Tonsillectomy 1987; left elbow fracture requiring cost 1989; left forearm fracture requiring gas 1995; right hand fracture 1997; uvula mass excision 2021  Social history: Lives in [place].  Has a girlfriend.  To children who are grown and gone.  Currently, not working.  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  Says that he has not worked in 5 years.  Says that the reason quit working,  was because he has 5-20 loose bowel movements daily, probably as a side effect of medication that he is taking.  History of smoking: Smoked 1-2 ppd X 20 years, now down to 5-10 cigarettes daily x2 years; in his younger years, was exposed to secondhand smoke from his mother and grandparents  History of alcohol abuse: Used to drink 1 or 230 pack beer daily; drank for 8 years; lately, down to 2-3 beers yearly  History of substance Abuse:  Use marijuana, methamphetamine, and cocaine for several years; quit 5-6 years ago  Family history:  Both grandparents experienced melanoma of skin.  Health maintenance:  PCP in [place]  -02/27/2025: Says that he had screening colonoscopy done in [place] in January 2025, and that it was normal; did show small hemorrhoids; apparently, no polyps or cancer      44-year-old gentleman, referred by Maximiliano Parra MD, [facility], for evaluation of leukocytosis and erythrocytosis.    Investigations reviewed:  -10/31/2023:  WBC 12.6 minimally elevated, hemoglobin 17.5 elevated, hematocrit 51.6 elevated, MCV normal, platelets 350 K normal, neutrophils 62%, lymphocytes 25%, monocytes 7%  -01/30/2024:  WBC 11.9 minimally elevated, hemoglobin 18.2 elevated, hematocrit 54.5 elevated, MCV normal, platelets 330 K, neutrophils 63%, lymphocytes 25%  -05/14/2024:  WBC 13.3 minimally elevated, hemoglobin 17.7, hematocrit 52.4, MCV normal, platelets 319 K, neutrophils 64%, lymphocytes 24%, ANC 9.0 K, ALC 3.3 K  -05/23/2024: WBC 11.8 minimally elevated, hemoglobin 17.4, hematocrit 51.2, MCV normal, platelets 306 K, neutrophils 60%, lymphocytes 27%, monocytes 7%, eosinophils 4%, ANC 7.3 K, lymphocytes 3.2 K eosinophils 0.5 K  -08/14/2024: WBC 12.7, hemoglobin 17.1, hematocrit 50.4, MCV normal, platelets 323 K, neutrophils 58%, lymphocytes 28%, monocytes 8%, eosinophils 4%, basophils 1%  -11/14/2024: WBC 13.1 slightly elevated, hemoglobin 17.6, hematocrit 52.3, MCV normal, platelets  329 K, neutrophils 63%, lymphocytes 25%, monocytes 7%, eosinophils 3%, basophils 1%  -02/14/2025:  WBC 12.7 K, hemoglobin 17.2, hematocrit 51.2, MCV normal, platelets 343 K, neutrophils 60%, lymphocytes 26%, monocytes 7%, eosinophils 3%, basophils 1%    -10/31/2023: CMP unremarkable  -01/30/2024:  CMP unremarkable  -05/14/2024: CMP unremarkable  -05/23/2024: CMP unremarkable    02/27/2025:  Pleasant, healthy-appearing gentleman who presents for initial hematology consultation.  In no acute discomfort.    Chronic smoker.  Chronic alcoholic.  Has cut down on smoking.  Has cut down on alcohol.  Has quit marijuana, methamphetamine, and cocaine.  No aquagenic pruritus.  However, always has itching.  No erythromelalgia.  No history of fevers, chills, night sweats, anorexia, unintentional weight loss, or bone pains.  Chronic weakness and fatigue.  However, ECOG 0.  Appetite is okay.  No history of blood clots.  No history of DVT or pulmonary embolism.  No history of blood clots in unusual locations like portal vein, splenic vein, cerebral veins, etc..  No history of malignancy.  No history of exposure to carbon monoxide.  Not exposed to automobile exhausted.  Not exposed to fumes.  Does not work in enclosed spaces, exposed to weekly exon cysts.  No history of COPD that he is aware of.  Denies chronic dyspnea.  No heart problems.  No history of hereditary erythrocytosis.  No history of CKD/renal artery stenosis/renal cysts/hydronephrosis.  No history of adrenal tumors.  No history of cerebellar hemangiomas, hepatomas, renal cell carcinoma, or pheochromocytoma.  No history of blood dropping.  Does not use anabolic steroids.  No chest pains, abdominal pains, myalgias, weakness, fatigue, headache, blurred vision, transient loss of vision, paresthesias, slow mentation, or sense of depersonalization.  No history of LAILA.  No daytime sleepiness.  No history of cyanotic heart disease.  No history of intracardiac or intrapulmonary  shunts.  No history of mucocutaneous telangiectasias.  Does not self inject erythropoiesis stimulating agents.    Interval History:  [No matching plan found]   [No matching plan found]     06/25/2025:   -bone marrow biopsy 05/15/2025:  Hypercellular, 70-80%; trilineage hematopoiesis with maturation; no significant dysplasia; flow cytometry negative; iron stores markedly decreased; no increase in reticulin fibrosis  -bone marrow biopsy 05/15/2025:  NGS testing positive for variants of unknown significance (Tier III) (IL7R; NF1); MPN/CML FISH panel negative for assay specific abnormalities  Presents for a follow-up visit.  Doing well.  No complaints.  No weakness, fatigue, malaise, headaches, epistaxis, aquagenic pruritus, erythromelalgia, drenching night sweats, fevers, etc..  No history of LAILA.  No daytime sleepiness.  No history of cyanotic heart disease.  No history of intracardiac or intrapulmonary shunts.  No history of mucocutaneous angioectasias.  ECOG 0.  No history of DVT, PE, or blood clots in unusual locations like portal vein, splenic vein, cerebral veins, etc..  No history of malignancy.  No history of exposure to carbon monoxide.  Smoked 1-2 ppd X 20 years; now, down to 5-10 cigarettes daily x2 years.  In his younger years, was exposed to secondhand smoke from his mother and grandparents.    Allergies as of 06/25/2025    (No Known Allergies)     Medications:  Medications Ordered Prior to Encounter[1]    Review of Systems:   All systems reviewed and found to be negative except for the symptoms detailed above    Physical Examination:   VITAL SIGNS:   Vitals:    06/25/25 0857   BP: (!) 147/79   Pulse: 95   Resp: 18   Temp: 98.3 °F (36.8 °C)       GENERAL:  In no apparent distress.    HEAD:  No signs of head trauma.  EYES:  Pupils are equal.  Extraocular motions intact.    EARS:  Hearing grossly intact.  MOUTH:  Oropharynx is normal.   NECK:  No adenopathy, no JVD.     CHEST:  Chest with clear breath sounds  bilaterally.  No wheezes, rales, rhonchi.    CARDIAC:  Regular rate and rhythm.  S1 and S2, without murmurs, gallops, rubs.  VASCULAR:  No Edema.  Peripheral pulses normal and equal in all extremities.  ABDOMEN:  Soft, without detectable tenderness.  No sign of distention.  No   rebound or guarding, and no masses palpated.   Bowel Sounds normal.  MUSCULOSKELETAL:  Good range of motion of all major joints. Extremities without clubbing, cyanosis or edema.    NEUROLOGIC EXAM:  Alert and oriented x 3.  No focal sensory or strength deficits.   Speech normal.  Follows commands.  PSYCHIATRIC:  Mood normal.    Assessment:  Problem List Items Addressed This Visit       Erythrocytosis    Leukocytosis    History of hepatitis C    Chronic diarrhea    History of smoking    History of alcohol abuse - Primary    Relevant Orders    CBC w/ DIFF    History of methamphetamine abuse    Relevant Orders    CBC w/ DIFF    History of cocaine abuse    Relevant Orders    CBC w/ DIFF    History of marijuana use    Adverse effect of sodium-glucose cotransporter 2 (SGLT2) inhibitor    Hepatic steatosis    Hepatomegaly    Secondary erythrocytosis       Orders for 06/25/2025:   Baby aspirin 81 mg daily  Start therapeutic phlebotomy  Removed 250 cc of blood weekly, replaced by equal volume of normal saline  Target hematocrit <45  Refer to GI for evaluation of hepatic steatosis and hepatomegaly  Follow-up with NP in 1 month    Above discussed with the patient.  All questions answered.   Discussed labs and plan of management.   He understands and agrees with this plan.  ===================================    # Erythrocytosis:     -10/31/2023:  WBC 12.6 minimally elevated, hemoglobin 17.5 elevated, hematocrit 51.6 elevated, MCV normal, platelets 350 K normal, neutrophils 62%, lymphocytes 25%, monocytes 7%  -01/30/2024:  WBC 11.9 minimally elevated, hemoglobin 18.2 elevated, hematocrit 54.5 elevated, MCV normal, platelets 330 K, neutrophils 63%,  lymphocytes 25%  -05/14/2024:  WBC 13.3 minimally elevated, hemoglobin 17.7, hematocrit 52.4, MCV normal, platelets 319 K, neutrophils 64%, lymphocytes 24%, ANC 9.0 K, ALC 3.3 K  -05/23/2024: WBC 11.8 minimally elevated, hemoglobin 17.4, hematocrit 51.2, MCV normal, platelets 306 K, neutrophils 60%, lymphocytes 27%, monocytes 7%, eosinophils 4%, ANC 7.3 K, lymphocytes 3.2 K eosinophils 0.5 K  -08/14/2024: WBC 12.7, hemoglobin 17.1, hematocrit 50.4, MCV normal, platelets 323 K, neutrophils 58%, lymphocytes 28%, monocytes 8%, eosinophils 4%, basophils 1%  -11/14/2024: WBC 13.1 slightly elevated, hemoglobin 17.6, hematocrit 52.3, MCV normal, platelets 329 K, neutrophils 63%, lymphocytes 25%, monocytes 7%, eosinophils 3%, basophils 1%  -02/14/2025:  WBC 12.7 K, hemoglobin 17.2, hematocrit 51.2, MCV normal, platelets 343 K, neutrophils 60%, lymphocytes 26%, monocytes 7%, eosinophils 3%, basophils 1%  -History of smoking: Smoked 1-2 ppd X 20 years, now down to 5-10 cigarettes daily x2 years; in his younger years, was exposed to secondhand smoke from his mother and grandparents  -History of alcohol abuse: Used to drink 1 or 2 30 pack beer daily; drank for 8 years; lately, down to 2 beers daily  -History of substance Abuse:  Use marijuana, methamphetamine, and cocaine for several years; quit 5-6 years ago  -02/27/2025: Room air pulse oximetry:  96% at rest, normal; 95% after physical activity, normal  -02/27/2025:  Carboxyhemoglobin 2.2%, normal  -02/27/2025:  Peripheral blood:  JAK2 V617F mutation negative; JAK2 exon 12 mutation negative; JAK2 exon 13 mutation negative; MPL mutation negative; CALR mutation negative  -02/27/2025:  Peripheral blood: BCR-ABL1 fusion transcript negative  -02/27/2025:  WBC 16.48; hemoglobin 17.5; hematocrit 50.3; platelets normal; differential count unremarkable  -02/27/2025:  CMP reviewed, essentially unremarkable  -02/27/2025: AFP level 2.0 normal  02/27/2025:  Erythropoietin level 8.8 normal  (reference Range:  2.6-18.5 mIU /ml)  -03/11/2025:  Complete abdominal ultrasound: Hepatic enlargement with steatosis; liver measures 23.4 cm; spleen measures 14.6 cm  -05/13/2025: WBC 13.98, hemoglobin 17.5, hematocrit 52.0, platelets 328 K, differential count normal, CMP reviewed  -05/05/2025: Flow cytometry of blood:  Normal IHC results; no monotypic B-cell population or increase in blasts identified  -bone marrow biopsy 05/15/2025:  Hypercellular, 70-80%; trilineage hematopoiesis with maturation; no significant dysplasia; flow cytometry negative; iron stores markedly decreased; no increase in reticulin fibrosis  -bone marrow biopsy 05/15/2025:  NGS testing positive for variants of unknown significance (Tier III) (IL7R; NF1); MPN/CML FISH panel negative for assay specific abnormalities  NGS studies are negative as well as the JAK2, CALR, MPL. The morphologic findings can be seen with a reactive erythrocytosis as well as a polycythemia vera. Because of the negative molecular studies, reactive erythrocytosis is favored.   Briefly:   Chronic erythrocytosis   JAK2, MPL, CALR mutations negative  BCR-ABL1 fusion transcript negative  Mild, neutrophilic leukocytosis as well  History of smoking, has cut down   History of alcohol abuse, has cut down  History of substance abuse, has quit  Room air pulse oximetry normal   Carboxyhemoglobin level is not elevated  Erythropoietin level is not subnormal but appears to be suppressed, 8.8, despite erythrocytosis  -bone marrow biopsy 05/15/2025:  Hypercellular, 70-80%; trilineage hematopoiesis with maturation; no significant dysplasia; flow cytometry negative; iron stores markedly decreased; no increase in reticulin fibrosis  -bone marrow biopsy 05/15/2025:  NGS testing positive for variants of unknown significance (Tier III) (IL7R; NF1); MPN/CML FISH panel negative for assay specific abnormalities; bone marrow negative for JAK2 mutation, CALR mutation, MPL mutation as  well  >>>  Plan:  -no convincing evidence of polycythemia vera  -we are most likely dealing with secondary erythrocytosis (secondary to chronic tobacco abuse, Jardiance, and chronic watery diarrhea)  -Has underlying cardiovascular risk factors (NIDDM, hypertension, smoking history); therefore, even if secondary erythrocytosis, will be reasonable for him to take baby aspirin daily  -secondary polycythemia; therapeutic phlebotomy if symptomatic hyperviscosity state; target hematocrit is based on the threshold that provide symptomatic relief; cautious phlebotomy, removal of 250 cc of blood, replaced by equal volume of crystalloid, to moderately reduce the hemoglobin may relieve the symptoms  -06/25/2025: Start therapeutic phlebotomy; removed 150 cc of blood weekly, replaced by equal volume of normal saline  -target hematocrit <45  -empirically, target hematocrit <45  -low-dose aspirin (because of presence of cardiovascular risk factors including NIDDM, hypertension, smoking history)  Follow-up with NP in 1 month    -Erythrocytosis can be secondary to chronic tobacco abuse, causing a state of hypoxia; strongly advised to quit smoking  -He is taking Jardiance [Empagliflozin, a Sodium-Glucose Cotransporter 2 (SGLT2) Inhibitor]  SGLT2 (sodium-glucose tx-oobtonaximq-9) inhibitors are known to cause erythrocytosis  -Chronic watery diarrhea; up to 5-20 loose stools daily; he attributes this to side effects of medications that he is taking; this can create a state of hypovolemia which can spuriously elevated H/H  -Absence of MPN associated mutations renders the possibility of polycythemia vera, remote     Discussion: Secondary polycythemia: Treatment:  # Management of secondary polycythemia is generally directed at ameliorating the underlying cause and contributing factors, alleviating symptoms, and/or reducing the risk of thrombosis  # symptom relief: For patients with symptoms attributable to secondary polycythemia (eg,  fatigue, headache, dizziness, mental slowing, visual changes, paresthesias, atypical chest pain, dyspnea, palpitations), therapeutic phlebotomy is indicated only if this ameliorates symptoms. The target hematocrit is based on the threshold that provides symptom relief; in such settings, cautious phlebotomy (eg, removal of 250 mL blood, replaced by an equal volume of crystalloid) to modestly reduce the Hb may relieve these symptoms. However, reduction of Hct to <55 percent range is likely to exacerbate dyspnea or other hypoxic symptoms.  Cytoreductive agents are not indicated  #Thrombosis prophylaxis: There is no persuasive evidence that prophylactic phlebotomy or cytoreduction reduces the risk of thrombosis in patients with secondary polycythemia.  Low-dose aspirin can be offered to patients with cardiovascular risk factors, twice-daily low-dose aspirin for patients with a history of arterial thrombosis, and systemic anticoagulation for those with a history of venous thromboembolism.     -abdominal ultrasound shows hepatomegaly and hepatic steatosis  -AFP level normal on 02/27/2025  -Have referred to GI for evaluation of hepatic steatosis and hepatomegaly    Follow-up in 3 weeks, when the results of bone marrow NGS testing are available    Above discussed at length with the patient.  All questions answered.    Discussed labs, differential diagnosis, and plan of management.    He understands and agrees with this plan.    Follow-up:  No follow-ups on file.                 [1]   Current Outpatient Medications on File Prior to Visit   Medication Sig Dispense Refill    amLODIPine (NORVASC) 10 MG tablet Take 10 mg by mouth.      cholecalciferol, vitamin D3, 1,250 mcg (50,000 unit) capsule Take 50,000 Units by mouth every 7 days.      cloNIDine (CATAPRES) 0.1 MG tablet Take 0.1 mg by mouth 2 (two) times daily.      fenofibrate (TRICOR) 145 MG tablet Take 145 mg by mouth.      glimepiride (AMARYL) 1 MG tablet Take 1 mg by  mouth every morning.      JANUVIA 100 mg Tab Take 100 mg by mouth.      JARDIANCE 25 mg tablet Take 25 mg by mouth.      lisinopriL 10 MG tablet Take 10 mg by mouth.      metFORMIN (GLUCOPHAGE) 500 MG tablet Take 500 mg by mouth.      rosuvastatin (CRESTOR) 10 MG tablet Take 10 mg by mouth.      tamsulosin (FLOMAX) 0.4 mg Cap Take 1 capsule by mouth.       No current facility-administered medications on file prior to visit.

## 2025-07-03 ENCOUNTER — INFUSION (OUTPATIENT)
Dept: INFUSION THERAPY | Facility: HOSPITAL | Age: 45
End: 2025-07-03
Attending: INTERNAL MEDICINE
Payer: MEDICAID

## 2025-07-03 ENCOUNTER — LAB VISIT (OUTPATIENT)
Dept: HEMATOLOGY/ONCOLOGY | Facility: CLINIC | Age: 45
End: 2025-07-03
Payer: MEDICAID

## 2025-07-03 VITALS
TEMPERATURE: 98 F | SYSTOLIC BLOOD PRESSURE: 143 MMHG | OXYGEN SATURATION: 97 % | DIASTOLIC BLOOD PRESSURE: 94 MMHG | WEIGHT: 255.75 LBS | HEIGHT: 69 IN | BODY MASS INDEX: 37.88 KG/M2 | HEART RATE: 94 BPM | RESPIRATION RATE: 18 BRPM

## 2025-07-03 DIAGNOSIS — F15.11 HISTORY OF METHAMPHETAMINE ABUSE: ICD-10-CM

## 2025-07-03 DIAGNOSIS — D75.1 ERYTHROCYTOSIS: Primary | ICD-10-CM

## 2025-07-03 DIAGNOSIS — F14.11 HISTORY OF COCAINE ABUSE: ICD-10-CM

## 2025-07-03 DIAGNOSIS — F10.11 HISTORY OF ALCOHOL ABUSE: ICD-10-CM

## 2025-07-03 LAB
BASOPHILS # BLD AUTO: 0.11 X10(3)/MCL
BASOPHILS NFR BLD AUTO: 0.7 %
EOSINOPHIL # BLD AUTO: 0.5 X10(3)/MCL (ref 0–0.9)
EOSINOPHIL NFR BLD AUTO: 3 %
ERYTHROCYTE [DISTWIDTH] IN BLOOD BY AUTOMATED COUNT: 12.4 % (ref 11.5–17)
HCT VFR BLD AUTO: 50.3 % (ref 42–52)
HGB BLD-MCNC: 17 G/DL (ref 14–18)
IMM GRANULOCYTES # BLD AUTO: 0.14 X10(3)/MCL (ref 0–0.04)
IMM GRANULOCYTES NFR BLD AUTO: 0.8 %
LYMPHOCYTES # BLD AUTO: 3.19 X10(3)/MCL (ref 0.6–4.6)
LYMPHOCYTES NFR BLD AUTO: 19.1 %
MCH RBC QN AUTO: 30.1 PG (ref 27–31)
MCHC RBC AUTO-ENTMCNC: 33.8 G/DL (ref 33–36)
MCV RBC AUTO: 89.2 FL (ref 80–94)
MONOCYTES # BLD AUTO: 1.2 X10(3)/MCL (ref 0.1–1.3)
MONOCYTES NFR BLD AUTO: 7.2 %
NEUTROPHILS # BLD AUTO: 11.57 X10(3)/MCL (ref 2.1–9.2)
NEUTROPHILS NFR BLD AUTO: 69.2 %
NRBC BLD AUTO-RTO: 0 %
PLATELET # BLD AUTO: 316 X10(3)/MCL (ref 130–400)
PMV BLD AUTO: 10.4 FL (ref 7.4–10.4)
RBC # BLD AUTO: 5.64 X10(6)/MCL (ref 4.7–6.1)
WBC # BLD AUTO: 16.71 X10(3)/MCL (ref 4.5–11.5)

## 2025-07-03 PROCEDURE — 25000003 PHARM REV CODE 250: Performed by: INTERNAL MEDICINE

## 2025-07-03 PROCEDURE — 99195 PHLEBOTOMY: CPT

## 2025-07-03 PROCEDURE — 85025 COMPLETE CBC W/AUTO DIFF WBC: CPT

## 2025-07-03 RX ORDER — SODIUM CHLORIDE 9 MG/ML
INJECTION, SOLUTION INTRAVENOUS CONTINUOUS
Start: 2025-07-09

## 2025-07-03 RX ORDER — SODIUM CHLORIDE 9 MG/ML
INJECTION, SOLUTION INTRAVENOUS CONTINUOUS
Status: DISCONTINUED | OUTPATIENT
Start: 2025-07-03 | End: 2025-07-03 | Stop reason: HOSPADM

## 2025-07-03 RX ADMIN — SODIUM CHLORIDE: 9 INJECTION, SOLUTION INTRAVENOUS at 02:07

## 2025-07-03 NOTE — NURSING
Pt ambulated to room with steady gait, alert & oriented x4. Denies complaints at this time. Educated on hct of 50.3 and phlebotomy required. 250mLs removed and infused 250 mLs of NS. Tolerated tx well.

## 2025-07-10 ENCOUNTER — LAB VISIT (OUTPATIENT)
Dept: HEMATOLOGY/ONCOLOGY | Facility: CLINIC | Age: 45
End: 2025-07-10
Payer: MEDICAID

## 2025-07-10 ENCOUNTER — INFUSION (OUTPATIENT)
Dept: INFUSION THERAPY | Facility: HOSPITAL | Age: 45
End: 2025-07-10
Attending: INTERNAL MEDICINE
Payer: MEDICAID

## 2025-07-10 VITALS
HEART RATE: 96 BPM | BODY MASS INDEX: 38.58 KG/M2 | HEIGHT: 69 IN | WEIGHT: 260.5 LBS | SYSTOLIC BLOOD PRESSURE: 121 MMHG | DIASTOLIC BLOOD PRESSURE: 95 MMHG | RESPIRATION RATE: 18 BRPM | OXYGEN SATURATION: 97 % | TEMPERATURE: 99 F

## 2025-07-10 DIAGNOSIS — F15.11 HISTORY OF METHAMPHETAMINE ABUSE: ICD-10-CM

## 2025-07-10 DIAGNOSIS — F10.11 HISTORY OF ALCOHOL ABUSE: ICD-10-CM

## 2025-07-10 DIAGNOSIS — F14.11 HISTORY OF COCAINE ABUSE: ICD-10-CM

## 2025-07-10 DIAGNOSIS — D75.1 ERYTHROCYTOSIS: Primary | ICD-10-CM

## 2025-07-10 LAB
BASOPHILS # BLD AUTO: 0.06 X10(3)/MCL
BASOPHILS NFR BLD AUTO: 0.4 %
EOSINOPHIL # BLD AUTO: 0.38 X10(3)/MCL (ref 0–0.9)
EOSINOPHIL NFR BLD AUTO: 2.5 %
ERYTHROCYTE [DISTWIDTH] IN BLOOD BY AUTOMATED COUNT: 12.4 % (ref 11.5–17)
HCT VFR BLD AUTO: 48.1 % (ref 42–52)
HGB BLD-MCNC: 16.4 G/DL (ref 14–18)
IMM GRANULOCYTES # BLD AUTO: 0.11 X10(3)/MCL (ref 0–0.04)
IMM GRANULOCYTES NFR BLD AUTO: 0.7 %
LYMPHOCYTES # BLD AUTO: 2.82 X10(3)/MCL (ref 0.6–4.6)
LYMPHOCYTES NFR BLD AUTO: 18.3 %
MCH RBC QN AUTO: 30.5 PG (ref 27–31)
MCHC RBC AUTO-ENTMCNC: 34.1 G/DL (ref 33–36)
MCV RBC AUTO: 89.4 FL (ref 80–94)
MONOCYTES # BLD AUTO: 1.01 X10(3)/MCL (ref 0.1–1.3)
MONOCYTES NFR BLD AUTO: 6.5 %
NEUTROPHILS # BLD AUTO: 11.06 X10(3)/MCL (ref 2.1–9.2)
NEUTROPHILS NFR BLD AUTO: 71.6 %
NRBC BLD AUTO-RTO: 0 %
PLATELET # BLD AUTO: 300 X10(3)/MCL (ref 130–400)
PMV BLD AUTO: 10.1 FL (ref 7.4–10.4)
RBC # BLD AUTO: 5.38 X10(6)/MCL (ref 4.7–6.1)
WBC # BLD AUTO: 15.44 X10(3)/MCL (ref 4.5–11.5)

## 2025-07-10 PROCEDURE — 85025 COMPLETE CBC W/AUTO DIFF WBC: CPT

## 2025-07-10 PROCEDURE — 99195 PHLEBOTOMY: CPT

## 2025-07-10 PROCEDURE — 25000003 PHARM REV CODE 250: Performed by: INTERNAL MEDICINE

## 2025-07-10 RX ORDER — SODIUM CHLORIDE 9 MG/ML
INJECTION, SOLUTION INTRAVENOUS CONTINUOUS
Status: DISCONTINUED | OUTPATIENT
Start: 2025-07-10 | End: 2025-07-10 | Stop reason: HOSPADM

## 2025-07-10 RX ORDER — SODIUM CHLORIDE 9 MG/ML
INJECTION, SOLUTION INTRAVENOUS CONTINUOUS
Start: 2025-07-17

## 2025-07-10 RX ADMIN — SODIUM CHLORIDE: 9 INJECTION, SOLUTION INTRAVENOUS at 01:07

## 2025-07-10 NOTE — NURSING
Pt ambulated with steady gait, alert & oriented x4. Denies complaints at this time. Educated on updated Hct level of 48.

## 2025-07-17 ENCOUNTER — INFUSION (OUTPATIENT)
Dept: INFUSION THERAPY | Facility: HOSPITAL | Age: 45
End: 2025-07-17
Attending: INTERNAL MEDICINE
Payer: MEDICAID

## 2025-07-17 ENCOUNTER — LAB VISIT (OUTPATIENT)
Dept: HEMATOLOGY/ONCOLOGY | Facility: CLINIC | Age: 45
End: 2025-07-17
Payer: MEDICAID

## 2025-07-17 VITALS
DIASTOLIC BLOOD PRESSURE: 85 MMHG | RESPIRATION RATE: 18 BRPM | SYSTOLIC BLOOD PRESSURE: 126 MMHG | HEART RATE: 101 BPM | HEIGHT: 69 IN | TEMPERATURE: 98 F | BODY MASS INDEX: 38.46 KG/M2 | OXYGEN SATURATION: 97 % | WEIGHT: 259.63 LBS

## 2025-07-17 DIAGNOSIS — F14.11 HISTORY OF COCAINE ABUSE: ICD-10-CM

## 2025-07-17 DIAGNOSIS — F15.11 HISTORY OF METHAMPHETAMINE ABUSE: ICD-10-CM

## 2025-07-17 DIAGNOSIS — F10.11 HISTORY OF ALCOHOL ABUSE: ICD-10-CM

## 2025-07-17 DIAGNOSIS — D75.1 ERYTHROCYTOSIS: Primary | ICD-10-CM

## 2025-07-17 LAB
BASOPHILS # BLD AUTO: 0.08 X10(3)/MCL
BASOPHILS NFR BLD AUTO: 0.5 %
EOSINOPHIL # BLD AUTO: 0.42 X10(3)/MCL (ref 0–0.9)
EOSINOPHIL NFR BLD AUTO: 2.8 %
ERYTHROCYTE [DISTWIDTH] IN BLOOD BY AUTOMATED COUNT: 12.2 % (ref 11.5–17)
HCT VFR BLD AUTO: 50.3 % (ref 42–52)
HGB BLD-MCNC: 17.1 G/DL (ref 14–18)
IMM GRANULOCYTES # BLD AUTO: 0.12 X10(3)/MCL (ref 0–0.04)
IMM GRANULOCYTES NFR BLD AUTO: 0.8 %
LYMPHOCYTES # BLD AUTO: 3.27 X10(3)/MCL (ref 0.6–4.6)
LYMPHOCYTES NFR BLD AUTO: 21.6 %
MCH RBC QN AUTO: 30.5 PG (ref 27–31)
MCHC RBC AUTO-ENTMCNC: 34 G/DL (ref 33–36)
MCV RBC AUTO: 89.8 FL (ref 80–94)
MONOCYTES # BLD AUTO: 1.03 X10(3)/MCL (ref 0.1–1.3)
MONOCYTES NFR BLD AUTO: 6.8 %
NEUTROPHILS # BLD AUTO: 10.22 X10(3)/MCL (ref 2.1–9.2)
NEUTROPHILS NFR BLD AUTO: 67.5 %
NRBC BLD AUTO-RTO: 0 %
PLATELET # BLD AUTO: 330 X10(3)/MCL (ref 130–400)
PMV BLD AUTO: 9.9 FL (ref 7.4–10.4)
RBC # BLD AUTO: 5.6 X10(6)/MCL (ref 4.7–6.1)
WBC # BLD AUTO: 15.14 X10(3)/MCL (ref 4.5–11.5)

## 2025-07-17 PROCEDURE — 25000003 PHARM REV CODE 250: Performed by: INTERNAL MEDICINE

## 2025-07-17 PROCEDURE — 99195 PHLEBOTOMY: CPT

## 2025-07-17 PROCEDURE — 85025 COMPLETE CBC W/AUTO DIFF WBC: CPT

## 2025-07-17 RX ORDER — SODIUM CHLORIDE 9 MG/ML
INJECTION, SOLUTION INTRAVENOUS CONTINUOUS
Status: DISCONTINUED | OUTPATIENT
Start: 2025-07-17 | End: 2025-07-17 | Stop reason: HOSPADM

## 2025-07-17 RX ORDER — SODIUM CHLORIDE 9 MG/ML
INJECTION, SOLUTION INTRAVENOUS CONTINUOUS
Start: 2025-07-24

## 2025-07-17 RX ADMIN — SODIUM CHLORIDE: 9 INJECTION, SOLUTION INTRAVENOUS at 01:07

## 2025-07-17 NOTE — NURSING
1300 Patient is here for labs & therapeutic phlebotomy. He voices no c/o. HCT is 50.3 today.  Parameters are to keep hct <45.0. Phlebotomized 251mls. 250cc NS IV replaced as per orders.

## 2025-07-23 DIAGNOSIS — D75.1 ERYTHROCYTOSIS: Primary | ICD-10-CM

## 2025-07-24 ENCOUNTER — LAB VISIT (OUTPATIENT)
Dept: HEMATOLOGY/ONCOLOGY | Facility: CLINIC | Age: 45
End: 2025-07-24
Payer: MEDICAID

## 2025-07-24 ENCOUNTER — OFFICE VISIT (OUTPATIENT)
Dept: HEMATOLOGY/ONCOLOGY | Facility: CLINIC | Age: 45
End: 2025-07-24
Payer: MEDICAID

## 2025-07-24 ENCOUNTER — INFUSION (OUTPATIENT)
Dept: INFUSION THERAPY | Facility: HOSPITAL | Age: 45
End: 2025-07-24
Attending: INTERNAL MEDICINE
Payer: MEDICAID

## 2025-07-24 VITALS
SYSTOLIC BLOOD PRESSURE: 139 MMHG | WEIGHT: 260 LBS | RESPIRATION RATE: 16 BRPM | SYSTOLIC BLOOD PRESSURE: 139 MMHG | RESPIRATION RATE: 18 BRPM | HEART RATE: 90 BPM | HEIGHT: 69 IN | TEMPERATURE: 99 F | DIASTOLIC BLOOD PRESSURE: 93 MMHG | TEMPERATURE: 99 F | BODY MASS INDEX: 38.51 KG/M2 | WEIGHT: 260.56 LBS | HEART RATE: 90 BPM | OXYGEN SATURATION: 96 % | HEIGHT: 69 IN | DIASTOLIC BLOOD PRESSURE: 93 MMHG | OXYGEN SATURATION: 99 % | BODY MASS INDEX: 38.59 KG/M2

## 2025-07-24 DIAGNOSIS — D75.1 ERYTHROCYTOSIS: Primary | ICD-10-CM

## 2025-07-24 DIAGNOSIS — Z72.0 TOBACCO ABUSE: ICD-10-CM

## 2025-07-24 DIAGNOSIS — D75.1 ERYTHROCYTOSIS: ICD-10-CM

## 2025-07-24 LAB
ALBUMIN SERPL-MCNC: 4 G/DL (ref 3.5–5)
ALBUMIN/GLOB SERPL: 1 RATIO (ref 1.1–2)
ALP SERPL-CCNC: 77 UNIT/L (ref 40–150)
ALT SERPL-CCNC: 24 UNIT/L (ref 0–55)
ANION GAP SERPL CALC-SCNC: 7 MEQ/L
AST SERPL-CCNC: 15 UNIT/L (ref 11–45)
BASOPHILS # BLD AUTO: 0.08 X10(3)/MCL
BASOPHILS NFR BLD AUTO: 0.5 %
BILIRUB SERPL-MCNC: 0.4 MG/DL
BUN SERPL-MCNC: 12.1 MG/DL (ref 8.9–20.6)
CALCIUM SERPL-MCNC: 9.3 MG/DL (ref 8.4–10.2)
CHLORIDE SERPL-SCNC: 107 MMOL/L (ref 98–107)
CO2 SERPL-SCNC: 25 MMOL/L (ref 22–29)
CREAT SERPL-MCNC: 0.78 MG/DL (ref 0.72–1.25)
CREAT/UREA NIT SERPL: 16
EOSINOPHIL # BLD AUTO: 0.3 X10(3)/MCL (ref 0–0.9)
EOSINOPHIL NFR BLD AUTO: 1.9 %
ERYTHROCYTE [DISTWIDTH] IN BLOOD BY AUTOMATED COUNT: 12.4 % (ref 11.5–17)
GFR SERPLBLD CREATININE-BSD FMLA CKD-EPI: >60 ML/MIN/1.73/M2
GLOBULIN SER-MCNC: 3.9 GM/DL (ref 2.4–3.5)
GLUCOSE SERPL-MCNC: 104 MG/DL (ref 74–100)
HCT VFR BLD AUTO: 48.6 % (ref 42–52)
HGB BLD-MCNC: 16.7 G/DL (ref 14–18)
IMM GRANULOCYTES # BLD AUTO: 0.13 X10(3)/MCL (ref 0–0.04)
IMM GRANULOCYTES NFR BLD AUTO: 0.8 %
LYMPHOCYTES # BLD AUTO: 3.48 X10(3)/MCL (ref 0.6–4.6)
LYMPHOCYTES NFR BLD AUTO: 21.6 %
MCH RBC QN AUTO: 30.6 PG (ref 27–31)
MCHC RBC AUTO-ENTMCNC: 34.4 G/DL (ref 33–36)
MCV RBC AUTO: 89.2 FL (ref 80–94)
MONOCYTES # BLD AUTO: 1.15 X10(3)/MCL (ref 0.1–1.3)
MONOCYTES NFR BLD AUTO: 7.1 %
NEUTROPHILS # BLD AUTO: 10.99 X10(3)/MCL (ref 2.1–9.2)
NEUTROPHILS NFR BLD AUTO: 68.1 %
NRBC BLD AUTO-RTO: 0 %
PLATELET # BLD AUTO: 305 X10(3)/MCL (ref 130–400)
PMV BLD AUTO: 9.7 FL (ref 7.4–10.4)
POTASSIUM SERPL-SCNC: 3.7 MMOL/L (ref 3.5–5.1)
PROT SERPL-MCNC: 7.9 GM/DL (ref 6.4–8.3)
RBC # BLD AUTO: 5.45 X10(6)/MCL (ref 4.7–6.1)
SODIUM SERPL-SCNC: 139 MMOL/L (ref 136–145)
WBC # BLD AUTO: 16.13 X10(3)/MCL (ref 4.5–11.5)

## 2025-07-24 PROCEDURE — 25000003 PHARM REV CODE 250: Performed by: INTERNAL MEDICINE

## 2025-07-24 PROCEDURE — 99214 OFFICE O/P EST MOD 30 MIN: CPT | Mod: PBBFAC,25

## 2025-07-24 PROCEDURE — 80053 COMPREHEN METABOLIC PANEL: CPT

## 2025-07-24 PROCEDURE — 85025 COMPLETE CBC W/AUTO DIFF WBC: CPT

## 2025-07-24 PROCEDURE — 99195 PHLEBOTOMY: CPT

## 2025-07-24 RX ORDER — SODIUM CHLORIDE 9 MG/ML
INJECTION, SOLUTION INTRAVENOUS CONTINUOUS
Status: DISCONTINUED | OUTPATIENT
Start: 2025-07-24 | End: 2025-07-24 | Stop reason: HOSPADM

## 2025-07-24 RX ORDER — SODIUM CHLORIDE 9 MG/ML
INJECTION, SOLUTION INTRAVENOUS CONTINUOUS
Start: 2025-07-31

## 2025-07-24 RX ADMIN — SODIUM CHLORIDE: 9 INJECTION, SOLUTION INTRAVENOUS at 01:07

## 2025-07-24 NOTE — NURSING
Pt ambulates with steady gait, alert & oriented x4. Educated on Hct of 48.6 so phlebotomy required. Tolerated tx well. Lynda to see pt in room 538.

## 2025-07-24 NOTE — PROGRESS NOTES
[facility] Hematology/Oncology  PROGRESS NOTE    Subjective:       Patient ID: Arturo Forrester is a 45 y.o. male.    Diagnosis:   Erythrocytosis  Mild leukocytosis  History of hep C, treated  Hepatic steatosis, hepatomegaly  Chronic diarrhea    Current Treatment:   therapeutic phlebotomy  Remove 250 cc of blood weekly, replaced by equal volume of normal saline  Target hematocrit <45    Treatment History:    Chief Complaint: Follow-up (Patient reports no new concerns as of today.)    HPI:  44-year-old gentleman, referred by Maximiliano Parra MD, [facility], for evaluation of leukocytosis and erythrocytosis.     Investigations reviewed:  -10/31/2023:  WBC 12.6 minimally elevated, hemoglobin 17.5 elevated, hematocrit 51.6 elevated, MCV normal, platelets 350 K normal, neutrophils 62%, lymphocytes 25%, monocytes 7%  -01/30/2024:  WBC 11.9 minimally elevated, hemoglobin 18.2 elevated, hematocrit 54.5 elevated, MCV normal, platelets 330 K, neutrophils 63%, lymphocytes 25%  -05/14/2024:  WBC 13.3 minimally elevated, hemoglobin 17.7, hematocrit 52.4, MCV normal, platelets 319 K, neutrophils 64%, lymphocytes 24%, ANC 9.0 K, ALC 3.3 K  -05/23/2024: WBC 11.8 minimally elevated, hemoglobin 17.4, hematocrit 51.2, MCV normal, platelets 306 K, neutrophils 60%, lymphocytes 27%, monocytes 7%, eosinophils 4%, ANC 7.3 K, lymphocytes 3.2 K eosinophils 0.5 K  -08/14/2024: WBC 12.7, hemoglobin 17.1, hematocrit 50.4, MCV normal, platelets 323 K, neutrophils 58%, lymphocytes 28%, monocytes 8%, eosinophils 4%, basophils 1%  -11/14/2024: WBC 13.1 slightly elevated, hemoglobin 17.6, hematocrit 52.3, MCV normal, platelets 329 K, neutrophils 63%, lymphocytes 25%, monocytes 7%, eosinophils 3%, basophils 1%  -02/14/2025:  WBC 12.7 K, hemoglobin 17.2, hematocrit 51.2, MCV normal, platelets 343 K, neutrophils 60%, lymphocytes 26%, monocytes 7%, eosinophils 3%, basophils 1%     -10/31/2023: CMP unremarkable  -01/30/2024:  CMP  unremarkable  -05/14/2024: CMP unremarkable  -05/23/2024: CMP unremarkable     02/27/2025:  Pleasant, healthy-appearing gentleman who presents for initial hematology consultation.  In no acute discomfort.    Chronic smoker.  Chronic alcoholic.  Has cut down on smoking.  Has cut down on alcohol.  Has quit marijuana, methamphetamine, and cocaine.  No aquagenic pruritus.  However, always has itching.  No erythromelalgia.  No history of fevers, chills, night sweats, anorexia, unintentional weight loss, or bone pains.  Chronic weakness and fatigue.  However, ECOG 0.  Appetite is okay.  No history of blood clots.  No history of DVT or pulmonary embolism.  No history of blood clots in unusual locations like portal vein, splenic vein, cerebral veins, etc..  No history of malignancy.  No history of exposure to carbon monoxide.  Not exposed to automobile exhausted.  Not exposed to fumes.  Does not work in enclosed spaces, exposed to weekly exon cysts.  No history of COPD that he is aware of.  Denies chronic dyspnea.  No heart problems.  No history of hereditary erythrocytosis.  No history of CKD/renal artery stenosis/renal cysts/hydronephrosis.  No history of adrenal tumors.  No history of cerebellar hemangiomas, hepatomas, renal cell carcinoma, or pheochromocytoma.  No history of blood dropping.  Does not use anabolic steroids.  No chest pains, abdominal pains, myalgias, weakness, fatigue, headache, blurred vision, transient loss of vision, paresthesias, slow mentation, or sense of depersonalization.  No history of LAILA.  No daytime sleepiness.  No history of cyanotic heart disease.  No history of intracardiac or intrapulmonary shunts.  No history of mucocutaneous telangiectasias.  Does not self inject erythropoiesis stimulating agents.    Past medical history:  NIDDM; essential hypertension; transaminitis; history of hep C (treated by a GI in [place], 3 years ago); COVID-19 (brief hospitalization 02/07/2023-02/08/2023);  dyslipidemia; hypovitaminosis D; low back pain; lymphocytosis; history of smoking; history of alcohol abuse; history of marijuana, methamphetamines, cocaine abuse  Procedure/surgical history:  Tonsillectomy 1987; left elbow fracture requiring cost 1989; left forearm fracture requiring gas 1995; right hand fracture 1997; uvula mass excision 2021  Social history: Lives in [place].  Has a girlfriend.  To children who are grown and gone.  Currently, not working.  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  Says that he has not worked in 5 years.  Says that the reason quit working, was because he has 5-20 loose bowel movements daily, probably as a side effect of medication that he is taking.  History of smoking: Smoked 1-2 ppd X 20 years, now down to 5-10 cigarettes daily x2 years; in his younger years, was exposed to secondhand smoke from his mother and grandparents  History of alcohol abuse: Used to drink 1 or 230 pack beer daily; drank for 8 years; lately, down to 2-3 beers yearly  History of substance Abuse:  Use marijuana, methamphetamine, and cocaine for several years; quit 5-6 years ago  Family history:  Both grandparents experienced melanoma of skin.  Health maintenance:  PCP in [place]  -02/27/2025: Says that he had screening colonoscopy done in [place] in January 2025, and that it was normal; did show small hemorrhoids; apparently, no polyps or cancer    Interval History:  She presents to clinic for a scheduled follow-up for erythrocytosis.  He he reports he takes aspirin every other day due to cost.  He continues with weekly lab work with possible phlebotomy if hematocrit greater than 45.  Today, his hemoglobin is 16.7 and hematocrit 48.6.  He denies any chest pain, shortness of breath, headache, or extremity edema/pain.  He continues to smoke cigarettes approximately a half a pack per day.  Labs and plan of care reviewed in detail with patient, verbalized  understanding    PMX/PSHx  Past Medical History:   Diagnosis Date    Diabetes     Essential (primary) hypertension     Hepatic steatosis     History of drug abuse     History of hepatitis C     Treated    HLD (hyperlipidemia)       Past Surgical History:   Procedure Laterality Date    COLONOSCOPY      TONSILLECTOMY       Allergies:  Review of patient's allergies indicates:  No Known Allergies   Social History:   Social History[1]  Medications:  Current Outpatient Medications   Medication Instructions    amLODIPine (NORVASC) 10 mg    cholecalciferol (vitamin D3) 50,000 Units, Every 7 days    cloNIDine (CATAPRES) 0.1 mg, 2 times daily    fenofibrate (TRICOR) 145 mg    glimepiride (AMARYL) 1 mg, Every morning    JANUVIA 100 mg    JARDIANCE 25 mg    lisinopriL 10 mg    metFORMIN (GLUCOPHAGE) 500 mg    rosuvastatin (CRESTOR) 10 mg    tamsulosin (FLOMAX) 0.4 mg Cap 1 capsule     ROS:  Review of Systems   Constitutional:  Negative for appetite change, chills, fatigue, fever and unexpected weight change.   HENT:  Negative for ear discharge, facial swelling, mouth sores, nosebleeds, sinus pressure/congestion and sore throat.    Eyes:  Negative for pain and visual disturbance.   Respiratory:  Negative for cough, chest tightness, shortness of breath and wheezing.    Cardiovascular:  Negative for chest pain, palpitations and leg swelling.   Gastrointestinal:  Negative for abdominal pain, blood in stool, change in bowel habit, constipation, diarrhea, nausea and vomiting.   Endocrine: Negative.    Genitourinary:  Negative for dysuria, frequency, hematuria and urgency.   Musculoskeletal:  Negative for arthralgias, gait problem, joint swelling and myalgias.   Integumentary:  Negative for color change, pallor, rash and wound.   Allergic/Immunologic: Negative.  Negative for frequent infections.   Neurological:  Negative for dizziness, vertigo, syncope, weakness and numbness.   Hematological:  Negative for adenopathy. Does not  bruise/bleed easily.   Psychiatric/Behavioral:  Negative for confusion and dysphoric mood. The patient is not nervous/anxious.    All other systems reviewed and are negative.      Objective:   Vitals:  Vitals:    07/24/25 1337   BP: (!) 139/93   Pulse: 90   Resp: 16   Temp: 98.6 °F (37 °C)      Wt Readings from Last 3 Encounters:   07/24/25 1305 118.2 kg (260 lb 9.3 oz)   07/24/25 1337 117.9 kg (260 lb)   07/17/25 1300 117.8 kg (259 lb 9.6 oz)      Physical Examination:  Physical Exam  Vitals and nursing note reviewed.   HENT:      Head: Normocephalic and atraumatic.      Mouth/Throat:      Mouth: Mucous membranes are moist.      Pharynx: Oropharynx is clear.   Eyes:      Extraocular Movements: Extraocular movements intact.      Conjunctiva/sclera: Conjunctivae normal.      Pupils: Pupils are equal, round, and reactive to light.   Cardiovascular:      Rate and Rhythm: Normal rate and regular rhythm.   Pulmonary:      Effort: Pulmonary effort is normal.      Breath sounds: Normal breath sounds.   Abdominal:      General: Abdomen is flat. Bowel sounds are normal.      Palpations: Abdomen is soft.   Musculoskeletal:         General: Normal range of motion.      Cervical back: Normal range of motion and neck supple.   Skin:     General: Skin is warm and dry.   Neurological:      General: No focal deficit present.      Mental Status: He is alert.   Psychiatric:         Mood and Affect: Mood normal.       ECOG SCORE           Labs:  Lab Visit on 07/24/2025   Component Date Value    Sodium 07/24/2025 139     Potassium 07/24/2025 3.7     Chloride 07/24/2025 107     CO2 07/24/2025 25     Glucose 07/24/2025 104 (H)     Blood Urea Nitrogen 07/24/2025 12.1     Creatinine 07/24/2025 0.78     Calcium 07/24/2025 9.3     Protein Total 07/24/2025 7.9     Albumin 07/24/2025 4.0     Globulin 07/24/2025 3.9 (H)     Albumin/Globulin Ratio 07/24/2025 1.0 (L)     Bilirubin Total 07/24/2025 0.4     ALP 07/24/2025 77     ALT 07/24/2025 24      AST 07/24/2025 15     eGFR 07/24/2025 >60     Anion Gap 07/24/2025 7.0     BUN/Creatinine Ratio 07/24/2025 16     WBC 07/24/2025 16.13 (H)     RBC 07/24/2025 5.45     Hgb 07/24/2025 16.7     Hct 07/24/2025 48.6     MCV 07/24/2025 89.2     MCH 07/24/2025 30.6     MCHC 07/24/2025 34.4     RDW 07/24/2025 12.4     Platelet 07/24/2025 305     MPV 07/24/2025 9.7     Neut % 07/24/2025 68.1     Lymph % 07/24/2025 21.6     Mono % 07/24/2025 7.1     Eos % 07/24/2025 1.9     Basophil % 07/24/2025 0.5     Imm Grans % 07/24/2025 0.8     Neut # 07/24/2025 10.99 (H)     Lymph # 07/24/2025 3.48     Mono # 07/24/2025 1.15     Eos # 07/24/2025 0.30     Baso # 07/24/2025 0.08     Imm Gran # 07/24/2025 0.13 (H)     NRBC% 07/24/2025 0.0       Pathology:  05/05/2025: Flow cytometry of blood:  Normal IHC results; no monotypic B-cell population or increase in blasts identified  -bone marrow biopsy 05/15/2025:  Hypercellular, 70-80%; trilineage hematopoiesis with maturation; no significant dysplasia; flow cytometry negative; iron stores markedly decreased; no increase in reticulin fibrosis  -bone marrow biopsy 05/15/2025:  NGS testing positive for variants of unknown significance (Tier III) (IL7R; NF1); MPN/CML FISH panel negative for assay specific abnormalities  NGS studies are negative as well as the JAK2, CALR, MPL. The morphologic findings can be seen with a reactive erythrocytosis as well as a polycythemia vera. Because of the negative molecular studies, reactive erythrocytosis is favored.     Radiology/Diagnostics:  -03/11/2025 Ultrasound abdomen complete  FINDINGS:  Liver measures 23.4 cm with increased echogenicity.  No focal hepatic mass or intrahepatic ductal dilatation.  Normal directional flow is in the main portal vein.   Pancreas is obscured by bowel gas.  Visualized aorta and IVC are normal.   Gallbladder is normal without stones, wall thickening or pericholecystic fluid.  Common bile duct measures 0.5 cm.   Spleen  measures 14.6 cm in length.   Right kidney measures 13.3 cm.  Left kidney measures 13.7 cm.  No stones or hydronephrosis.  Renal echogenicity is normal.   Impression:   Hepatic enlargement with steatosis    I have reviewed all available lab results and radiology reports.  Assessment:   Erythrocytosis  Continue baby aspirin 81 mg daily  Proceed with therapeutic phlebotomy today  Remove 250 ml weekly and replace by equal volume of normal saline  Target hemoglobin <45  Problem List Items Addressed This Visit       Erythrocytosis - Primary    Tobacco abuse         Plan:    07/24/2025  Continue baby aspirin 81 mg daily  Proceed with therapeutic phlebotomy today  Remove 250 ml weekly and replace by equal volume of normal saline  Target hemoglobin <45  Refer to GI for evaluation of hepatic steatosis and hepatomegaly  Continue weekly CBC and possible phlebotomy  RTC in 4 weeks labs/NP/possible phlebotomy  CBC CMP       Providers:         Orders for 06/25/2025:   Baby aspirin 81 mg daily  Start therapeutic phlebotomy  Removed 250 cc of blood weekly, replaced by equal volume of normal saline  Target hematocrit <45  Refer to GI for evaluation of hepatic steatosis and hepatomegaly  Follow-up with NP in 1 month     Above discussed with the patient.  All questions answered.   Discussed labs and plan of management.   He understands and agrees with this plan.  ===================================     # Erythrocytosis:     -10/31/2023:  WBC 12.6 minimally elevated, hemoglobin 17.5 elevated, hematocrit 51.6 elevated, MCV normal, platelets 350 K normal, neutrophils 62%, lymphocytes 25%, monocytes 7%  -01/30/2024:  WBC 11.9 minimally elevated, hemoglobin 18.2 elevated, hematocrit 54.5 elevated, MCV normal, platelets 330 K, neutrophils 63%, lymphocytes 25%  -05/14/2024:  WBC 13.3 minimally elevated, hemoglobin 17.7, hematocrit 52.4, MCV normal, platelets 319 K, neutrophils 64%, lymphocytes 24%, ANC 9.0 K, ALC 3.3 K  -05/23/2024: WBC 11.8  minimally elevated, hemoglobin 17.4, hematocrit 51.2, MCV normal, platelets 306 K, neutrophils 60%, lymphocytes 27%, monocytes 7%, eosinophils 4%, ANC 7.3 K, lymphocytes 3.2 K eosinophils 0.5 K  -08/14/2024: WBC 12.7, hemoglobin 17.1, hematocrit 50.4, MCV normal, platelets 323 K, neutrophils 58%, lymphocytes 28%, monocytes 8%, eosinophils 4%, basophils 1%  -11/14/2024: WBC 13.1 slightly elevated, hemoglobin 17.6, hematocrit 52.3, MCV normal, platelets 329 K, neutrophils 63%, lymphocytes 25%, monocytes 7%, eosinophils 3%, basophils 1%  -02/14/2025:  WBC 12.7 K, hemoglobin 17.2, hematocrit 51.2, MCV normal, platelets 343 K, neutrophils 60%, lymphocytes 26%, monocytes 7%, eosinophils 3%, basophils 1%  -History of smoking: Smoked 1-2 ppd X 20 years, now down to 5-10 cigarettes daily x2 years; in his younger years, was exposed to secondhand smoke from his mother and grandparents  -History of alcohol abuse: Used to drink 1 or 2 30 pack beer daily; drank for 8 years; lately, down to 2 beers daily  -History of substance Abuse:  Use marijuana, methamphetamine, and cocaine for several years; quit 5-6 years ago  -02/27/2025: Room air pulse oximetry:  96% at rest, normal; 95% after physical activity, normal  -02/27/2025:  Carboxyhemoglobin 2.2%, normal  -02/27/2025:  Peripheral blood:  JAK2 V617F mutation negative; JAK2 exon 12 mutation negative; JAK2 exon 13 mutation negative; MPL mutation negative; CALR mutation negative  -02/27/2025:  Peripheral blood: BCR-ABL1 fusion transcript negative  -02/27/2025:  WBC 16.48; hemoglobin 17.5; hematocrit 50.3; platelets normal; differential count unremarkable  -02/27/2025:  CMP reviewed, essentially unremarkable  -02/27/2025: AFP level 2.0 normal  02/27/2025:  Erythropoietin level 8.8 normal (reference Range:  2.6-18.5 mIU /ml)  -03/11/2025:  Complete abdominal ultrasound: Hepatic enlargement with steatosis; liver measures 23.4 cm; spleen measures 14.6 cm  -05/13/2025: WBC 13.98,  hemoglobin 17.5, hematocrit 52.0, platelets 328 K, differential count normal, CMP reviewed  -05/05/2025: Flow cytometry of blood:  Normal IHC results; no monotypic B-cell population or increase in blasts identified  -bone marrow biopsy 05/15/2025:  Hypercellular, 70-80%; trilineage hematopoiesis with maturation; no significant dysplasia; flow cytometry negative; iron stores markedly decreased; no increase in reticulin fibrosis  -bone marrow biopsy 05/15/2025:  NGS testing positive for variants of unknown significance (Tier III) (IL7R; NF1); MPN/CML FISH panel negative for assay specific abnormalities  NGS studies are negative as well as the JAK2, CALR, MPL. The morphologic findings can be seen with a reactive erythrocytosis as well as a polycythemia vera. Because of the negative molecular studies, reactive erythrocytosis is favored.   Briefly:   Chronic erythrocytosis   JAK2, MPL, CALR mutations negative  BCR-ABL1 fusion transcript negative  Mild, neutrophilic leukocytosis as well  History of smoking, has cut down   History of alcohol abuse, has cut down  History of substance abuse, has quit  Room air pulse oximetry normal   Carboxyhemoglobin level is not elevated  Erythropoietin level is not subnormal but appears to be suppressed, 8.8, despite erythrocytosis  -bone marrow biopsy 05/15/2025:  Hypercellular, 70-80%; trilineage hematopoiesis with maturation; no significant dysplasia; flow cytometry negative; iron stores markedly decreased; no increase in reticulin fibrosis  -bone marrow biopsy 05/15/2025:  NGS testing positive for variants of unknown significance (Tier III) (IL7R; NF1); MPN/CML FISH panel negative for assay specific abnormalities; bone marrow negative for JAK2 mutation, CALR mutation, MPL mutation as well  >>>  Plan:  -no convincing evidence of polycythemia vera  -we are most likely dealing with secondary erythrocytosis (secondary to chronic tobacco abuse, Jardiance, and chronic watery diarrhea)  -Has  underlying cardiovascular risk factors (NIDDM, hypertension, smoking history); therefore, even if secondary erythrocytosis, will be reasonable for him to take baby aspirin daily  -secondary polycythemia; therapeutic phlebotomy if symptomatic hyperviscosity state; target hematocrit is based on the threshold that provide symptomatic relief; cautious phlebotomy, removal of 250 cc of blood, replaced by equal volume of crystalloid, to moderately reduce the hemoglobin may relieve the symptoms  -06/25/2025: Start therapeutic phlebotomy; removed 150 cc of blood weekly, replaced by equal volume of normal saline  -target hematocrit <45  -empirically, target hematocrit <45  -low-dose aspirin (because of presence of cardiovascular risk factors including NIDDM, hypertension, smoking history)  Follow-up with NP in 1 month     -Erythrocytosis can be secondary to chronic tobacco abuse, causing a state of hypoxia; strongly advised to quit smoking  -He is taking Jardiance [Empagliflozin, a Sodium-Glucose Cotransporter 2 (SGLT2) Inhibitor]  SGLT2 (sodium-glucose fj-mnmxocsomsa-1) inhibitors are known to cause erythrocytosis  -Chronic watery diarrhea; up to 5-20 loose stools daily; he attributes this to side effects of medications that he is taking; this can create a state of hypovolemia which can spuriously elevated H/H  -Absence of MPN associated mutations renders the possibility of polycythemia vera, remote      Discussion: Secondary polycythemia: Treatment:  # Management of secondary polycythemia is generally directed at ameliorating the underlying cause and contributing factors, alleviating symptoms, and/or reducing the risk of thrombosis  # symptom relief: For patients with symptoms attributable to secondary polycythemia (eg, fatigue, headache, dizziness, mental slowing, visual changes, paresthesias, atypical chest pain, dyspnea, palpitations), therapeutic phlebotomy is indicated only if this ameliorates symptoms. The target  hematocrit is based on the threshold that provides symptom relief; in such settings, cautious phlebotomy (eg, removal of 250 mL blood, replaced by an equal volume of crystalloid) to modestly reduce the Hb may relieve these symptoms. However, reduction of Hct to <55 percent range is likely to exacerbate dyspnea or other hypoxic symptoms.  Cytoreductive agents are not indicated  #Thrombosis prophylaxis: There is no persuasive evidence that prophylactic phlebotomy or cytoreduction reduces the risk of thrombosis in patients with secondary polycythemia.  Low-dose aspirin can be offered to patients with cardiovascular risk factors, twice-daily low-dose aspirin for patients with a history of arterial thrombosis, and systemic anticoagulation for those with a history of venous thromboembolism.      -abdominal ultrasound shows hepatomegaly and hepatic steatosis  -AFP level normal on 02/27/2025  -Have referred to GI for evaluation of hepatic steatosis and hepatomegaly     Follow-up in 3 weeks, when the results of bone marrow NGS testing are available     Above discussed at length with the patient.  All questions answered.    Discussed labs, differential diagnosis, and plan of management.    He understands and agrees with this plan.           I have explained all of the above in detail and the patient understands all of the current recommendation(s). I have answered all of their questions to the best of my ability and to their complete satisfaction.       Lynda Nuñez, FNP-C  Oncology/Hematology   [facility]           [1]   Social History  Tobacco Use    Smoking status: Every Day     Types: Cigarettes    Smokeless tobacco: Never    Tobacco comments:     About 1/2 pack per day   Substance Use Topics    Alcohol use: Yes     Comment: 3 times a year    Drug use: Not Currently     Types: Methamphetamines, Marijuana, Cocaine     Comment: has not used in years

## 2025-07-31 ENCOUNTER — INFUSION (OUTPATIENT)
Dept: INFUSION THERAPY | Facility: HOSPITAL | Age: 45
End: 2025-07-31
Attending: INTERNAL MEDICINE
Payer: MEDICAID

## 2025-07-31 ENCOUNTER — LAB VISIT (OUTPATIENT)
Dept: HEMATOLOGY/ONCOLOGY | Facility: CLINIC | Age: 45
End: 2025-07-31
Payer: MEDICAID

## 2025-07-31 VITALS
RESPIRATION RATE: 18 BRPM | TEMPERATURE: 98 F | DIASTOLIC BLOOD PRESSURE: 84 MMHG | HEART RATE: 100 BPM | WEIGHT: 261.94 LBS | HEIGHT: 69 IN | OXYGEN SATURATION: 97 % | SYSTOLIC BLOOD PRESSURE: 127 MMHG | BODY MASS INDEX: 38.8 KG/M2

## 2025-07-31 DIAGNOSIS — D75.1 ERYTHROCYTOSIS: Primary | ICD-10-CM

## 2025-07-31 DIAGNOSIS — D75.1 ERYTHROCYTOSIS: ICD-10-CM

## 2025-07-31 LAB
ALBUMIN SERPL-MCNC: 4 G/DL (ref 3.5–5)
ALBUMIN/GLOB SERPL: 1 RATIO (ref 1.1–2)
ALP SERPL-CCNC: 85 UNIT/L (ref 40–150)
ALT SERPL-CCNC: 20 UNIT/L (ref 0–55)
ANION GAP SERPL CALC-SCNC: 8 MEQ/L
AST SERPL-CCNC: 14 UNIT/L (ref 11–45)
BASOPHILS # BLD AUTO: 0.08 X10(3)/MCL
BASOPHILS NFR BLD AUTO: 0.5 %
BILIRUB SERPL-MCNC: 0.3 MG/DL
BUN SERPL-MCNC: 14.5 MG/DL (ref 8.9–20.6)
CALCIUM SERPL-MCNC: 9.6 MG/DL (ref 8.4–10.2)
CHLORIDE SERPL-SCNC: 108 MMOL/L (ref 98–107)
CO2 SERPL-SCNC: 23 MMOL/L (ref 22–29)
CREAT SERPL-MCNC: 0.75 MG/DL (ref 0.72–1.25)
CREAT/UREA NIT SERPL: 19
EOSINOPHIL # BLD AUTO: 0.31 X10(3)/MCL (ref 0–0.9)
EOSINOPHIL NFR BLD AUTO: 2 %
ERYTHROCYTE [DISTWIDTH] IN BLOOD BY AUTOMATED COUNT: 12.3 % (ref 11.5–17)
GFR SERPLBLD CREATININE-BSD FMLA CKD-EPI: >60 ML/MIN/1.73/M2
GLOBULIN SER-MCNC: 4.1 GM/DL (ref 2.4–3.5)
GLUCOSE SERPL-MCNC: 114 MG/DL (ref 74–100)
HCT VFR BLD AUTO: 49 % (ref 42–52)
HGB BLD-MCNC: 16.6 G/DL (ref 14–18)
IMM GRANULOCYTES # BLD AUTO: 0.16 X10(3)/MCL (ref 0–0.04)
IMM GRANULOCYTES NFR BLD AUTO: 1 %
LYMPHOCYTES # BLD AUTO: 3.07 X10(3)/MCL (ref 0.6–4.6)
LYMPHOCYTES NFR BLD AUTO: 19.8 %
MCH RBC QN AUTO: 30.6 PG (ref 27–31)
MCHC RBC AUTO-ENTMCNC: 33.9 G/DL (ref 33–36)
MCV RBC AUTO: 90.4 FL (ref 80–94)
MONOCYTES # BLD AUTO: 1.14 X10(3)/MCL (ref 0.1–1.3)
MONOCYTES NFR BLD AUTO: 7.4 %
NEUTROPHILS # BLD AUTO: 10.74 X10(3)/MCL (ref 2.1–9.2)
NEUTROPHILS NFR BLD AUTO: 69.3 %
NRBC BLD AUTO-RTO: 0 %
PLATELET # BLD AUTO: 325 X10(3)/MCL (ref 130–400)
PMV BLD AUTO: 10.1 FL (ref 7.4–10.4)
POTASSIUM SERPL-SCNC: 4 MMOL/L (ref 3.5–5.1)
PROT SERPL-MCNC: 8.1 GM/DL (ref 6.4–8.3)
RBC # BLD AUTO: 5.42 X10(6)/MCL (ref 4.7–6.1)
SODIUM SERPL-SCNC: 139 MMOL/L (ref 136–145)
WBC # BLD AUTO: 15.5 X10(3)/MCL (ref 4.5–11.5)

## 2025-07-31 PROCEDURE — 25000003 PHARM REV CODE 250: Performed by: INTERNAL MEDICINE

## 2025-07-31 PROCEDURE — 85025 COMPLETE CBC W/AUTO DIFF WBC: CPT

## 2025-07-31 PROCEDURE — 99195 PHLEBOTOMY: CPT

## 2025-07-31 PROCEDURE — 80053 COMPREHEN METABOLIC PANEL: CPT

## 2025-07-31 RX ORDER — SODIUM CHLORIDE 9 MG/ML
INJECTION, SOLUTION INTRAVENOUS CONTINUOUS
Status: DISCONTINUED | OUTPATIENT
Start: 2025-07-31 | End: 2025-07-31 | Stop reason: HOSPADM

## 2025-07-31 RX ORDER — SODIUM CHLORIDE 9 MG/ML
INJECTION, SOLUTION INTRAVENOUS CONTINUOUS
Start: 2025-08-07

## 2025-07-31 RX ADMIN — SODIUM CHLORIDE: 9 INJECTION, SOLUTION INTRAVENOUS at 11:07

## 2025-07-31 NOTE — NURSING
Pt ambualtes with steady gait, alert & oriented x4. Denies complaints at this time. Educated on today's Hct result of 49 and will need phlebotomy of 250mL. Tolerated tx well, post fluids, 250 mL, administered.

## 2025-08-07 ENCOUNTER — INFUSION (OUTPATIENT)
Dept: INFUSION THERAPY | Facility: HOSPITAL | Age: 45
End: 2025-08-07
Attending: INTERNAL MEDICINE
Payer: MEDICAID

## 2025-08-07 ENCOUNTER — LAB VISIT (OUTPATIENT)
Dept: HEMATOLOGY/ONCOLOGY | Facility: CLINIC | Age: 45
End: 2025-08-07
Payer: MEDICAID

## 2025-08-07 VITALS
BODY MASS INDEX: 38.53 KG/M2 | DIASTOLIC BLOOD PRESSURE: 70 MMHG | SYSTOLIC BLOOD PRESSURE: 105 MMHG | HEART RATE: 92 BPM | HEIGHT: 69 IN | TEMPERATURE: 98 F | WEIGHT: 260.13 LBS | RESPIRATION RATE: 20 BRPM | OXYGEN SATURATION: 99 %

## 2025-08-07 DIAGNOSIS — D75.1 ERYTHROCYTOSIS: ICD-10-CM

## 2025-08-07 DIAGNOSIS — D75.1 ERYTHROCYTOSIS: Primary | ICD-10-CM

## 2025-08-07 LAB
ALBUMIN SERPL-MCNC: 4 G/DL (ref 3.5–5)
ALBUMIN/GLOB SERPL: 1 RATIO (ref 1.1–2)
ALP SERPL-CCNC: 75 UNIT/L (ref 40–150)
ALT SERPL-CCNC: 21 UNIT/L (ref 0–55)
ANION GAP SERPL CALC-SCNC: 7 MEQ/L
AST SERPL-CCNC: 16 UNIT/L (ref 11–45)
BASOPHILS # BLD AUTO: 0.1 X10(3)/MCL
BASOPHILS NFR BLD AUTO: 0.6 %
BILIRUB SERPL-MCNC: 0.3 MG/DL
BUN SERPL-MCNC: 13.1 MG/DL (ref 8.9–20.6)
CALCIUM SERPL-MCNC: 9.3 MG/DL (ref 8.4–10.2)
CHLORIDE SERPL-SCNC: 110 MMOL/L (ref 98–107)
CO2 SERPL-SCNC: 24 MMOL/L (ref 22–29)
CREAT SERPL-MCNC: 0.77 MG/DL (ref 0.72–1.25)
CREAT/UREA NIT SERPL: 17
EOSINOPHIL # BLD AUTO: 0.37 X10(3)/MCL (ref 0–0.9)
EOSINOPHIL NFR BLD AUTO: 2.4 %
ERYTHROCYTE [DISTWIDTH] IN BLOOD BY AUTOMATED COUNT: 12.4 % (ref 11.5–17)
GFR SERPLBLD CREATININE-BSD FMLA CKD-EPI: >60 ML/MIN/1.73/M2
GLOBULIN SER-MCNC: 4.1 GM/DL (ref 2.4–3.5)
GLUCOSE SERPL-MCNC: 124 MG/DL (ref 74–100)
HCT VFR BLD AUTO: 47.8 % (ref 42–52)
HGB BLD-MCNC: 16.5 G/DL (ref 14–18)
IMM GRANULOCYTES # BLD AUTO: 0.1 X10(3)/MCL (ref 0–0.04)
IMM GRANULOCYTES NFR BLD AUTO: 0.6 %
LYMPHOCYTES # BLD AUTO: 3.32 X10(3)/MCL (ref 0.6–4.6)
LYMPHOCYTES NFR BLD AUTO: 21.3 %
MCH RBC QN AUTO: 31 PG (ref 27–31)
MCHC RBC AUTO-ENTMCNC: 34.5 G/DL (ref 33–36)
MCV RBC AUTO: 89.8 FL (ref 80–94)
MONOCYTES # BLD AUTO: 1.08 X10(3)/MCL (ref 0.1–1.3)
MONOCYTES NFR BLD AUTO: 6.9 %
NEUTROPHILS # BLD AUTO: 10.62 X10(3)/MCL (ref 2.1–9.2)
NEUTROPHILS NFR BLD AUTO: 68.2 %
NRBC BLD AUTO-RTO: 0 %
PLATELET # BLD AUTO: 326 X10(3)/MCL (ref 130–400)
PMV BLD AUTO: 10 FL (ref 7.4–10.4)
POTASSIUM SERPL-SCNC: 3.9 MMOL/L (ref 3.5–5.1)
PROT SERPL-MCNC: 8.1 GM/DL (ref 6.4–8.3)
RBC # BLD AUTO: 5.32 X10(6)/MCL (ref 4.7–6.1)
SODIUM SERPL-SCNC: 141 MMOL/L (ref 136–145)
WBC # BLD AUTO: 15.59 X10(3)/MCL (ref 4.5–11.5)

## 2025-08-07 PROCEDURE — 85025 COMPLETE CBC W/AUTO DIFF WBC: CPT

## 2025-08-07 PROCEDURE — 25000003 PHARM REV CODE 250: Performed by: INTERNAL MEDICINE

## 2025-08-07 PROCEDURE — 80053 COMPREHEN METABOLIC PANEL: CPT

## 2025-08-07 RX ORDER — SODIUM CHLORIDE 9 MG/ML
INJECTION, SOLUTION INTRAVENOUS CONTINUOUS
Status: DISCONTINUED | OUTPATIENT
Start: 2025-08-07 | End: 2025-08-07 | Stop reason: HOSPADM

## 2025-08-07 RX ORDER — SODIUM CHLORIDE 9 MG/ML
INJECTION, SOLUTION INTRAVENOUS CONTINUOUS
Start: 2025-08-14

## 2025-08-07 RX ADMIN — SODIUM CHLORIDE: 9 INJECTION, SOLUTION INTRAVENOUS at 01:08

## 2025-08-07 NOTE — NURSING
"Patient here for therapeutic phlebotomy for HCT of 47.8. Order to phlebotomize for HCT over 45. Remove 250 ml blood. Bp prior to treatment 128/88 post standing 105/70. Patient did get 250 ml NS post treatment. Patient is not symptomatic. Patient states "he feels fine." Patient tolerated well.   "

## 2025-08-14 ENCOUNTER — INFUSION (OUTPATIENT)
Dept: INFUSION THERAPY | Facility: HOSPITAL | Age: 45
End: 2025-08-14
Attending: INTERNAL MEDICINE
Payer: MEDICAID

## 2025-08-14 ENCOUNTER — LAB VISIT (OUTPATIENT)
Dept: HEMATOLOGY/ONCOLOGY | Facility: CLINIC | Age: 45
End: 2025-08-14
Payer: MEDICAID

## 2025-08-14 VITALS
OXYGEN SATURATION: 97 % | HEIGHT: 69 IN | TEMPERATURE: 98 F | DIASTOLIC BLOOD PRESSURE: 87 MMHG | WEIGHT: 261.81 LBS | HEART RATE: 100 BPM | BODY MASS INDEX: 38.78 KG/M2 | RESPIRATION RATE: 20 BRPM | SYSTOLIC BLOOD PRESSURE: 139 MMHG

## 2025-08-14 DIAGNOSIS — D75.1 ERYTHROCYTOSIS: ICD-10-CM

## 2025-08-14 DIAGNOSIS — D75.1 ERYTHROCYTOSIS: Primary | ICD-10-CM

## 2025-08-14 LAB
ALBUMIN SERPL-MCNC: 3.8 G/DL (ref 3.5–5)
ALBUMIN/GLOB SERPL: 0.9 RATIO (ref 1.1–2)
ALP SERPL-CCNC: 78 UNIT/L (ref 40–150)
ALT SERPL-CCNC: 20 UNIT/L (ref 0–55)
ANION GAP SERPL CALC-SCNC: 8 MEQ/L
AST SERPL-CCNC: 14 UNIT/L (ref 11–45)
BASOPHILS # BLD AUTO: 0.07 X10(3)/MCL
BASOPHILS NFR BLD AUTO: 0.5 %
BILIRUB SERPL-MCNC: 0.3 MG/DL
BUN SERPL-MCNC: 13 MG/DL (ref 8.9–20.6)
CALCIUM SERPL-MCNC: 9.3 MG/DL (ref 8.4–10.2)
CHLORIDE SERPL-SCNC: 111 MMOL/L (ref 98–107)
CO2 SERPL-SCNC: 23 MMOL/L (ref 22–29)
CREAT SERPL-MCNC: 0.7 MG/DL (ref 0.72–1.25)
CREAT/UREA NIT SERPL: 19
EOSINOPHIL # BLD AUTO: 0.36 X10(3)/MCL (ref 0–0.9)
EOSINOPHIL NFR BLD AUTO: 2.6 %
ERYTHROCYTE [DISTWIDTH] IN BLOOD BY AUTOMATED COUNT: 12.1 % (ref 11.5–17)
GFR SERPLBLD CREATININE-BSD FMLA CKD-EPI: >60 ML/MIN/1.73/M2
GLOBULIN SER-MCNC: 4.3 GM/DL (ref 2.4–3.5)
GLUCOSE SERPL-MCNC: 138 MG/DL (ref 74–100)
HCT VFR BLD AUTO: 47.6 % (ref 42–52)
HGB BLD-MCNC: 16.1 G/DL (ref 14–18)
IMM GRANULOCYTES # BLD AUTO: 0.1 X10(3)/MCL (ref 0–0.04)
IMM GRANULOCYTES NFR BLD AUTO: 0.7 %
LYMPHOCYTES # BLD AUTO: 2.67 X10(3)/MCL (ref 0.6–4.6)
LYMPHOCYTES NFR BLD AUTO: 19.6 %
MCH RBC QN AUTO: 30.2 PG (ref 27–31)
MCHC RBC AUTO-ENTMCNC: 33.8 G/DL (ref 33–36)
MCV RBC AUTO: 89.3 FL (ref 80–94)
MONOCYTES # BLD AUTO: 0.96 X10(3)/MCL (ref 0.1–1.3)
MONOCYTES NFR BLD AUTO: 7 %
NEUTROPHILS # BLD AUTO: 9.46 X10(3)/MCL (ref 2.1–9.2)
NEUTROPHILS NFR BLD AUTO: 69.6 %
NRBC BLD AUTO-RTO: 0 %
PLATELET # BLD AUTO: 312 X10(3)/MCL (ref 130–400)
PMV BLD AUTO: 10.1 FL (ref 7.4–10.4)
POTASSIUM SERPL-SCNC: 3.8 MMOL/L (ref 3.5–5.1)
PROT SERPL-MCNC: 8.1 GM/DL (ref 6.4–8.3)
RBC # BLD AUTO: 5.33 X10(6)/MCL (ref 4.7–6.1)
SODIUM SERPL-SCNC: 142 MMOL/L (ref 136–145)
WBC # BLD AUTO: 13.62 X10(3)/MCL (ref 4.5–11.5)

## 2025-08-14 PROCEDURE — 80053 COMPREHEN METABOLIC PANEL: CPT

## 2025-08-14 PROCEDURE — 25000003 PHARM REV CODE 250: Performed by: INTERNAL MEDICINE

## 2025-08-14 PROCEDURE — 85025 COMPLETE CBC W/AUTO DIFF WBC: CPT

## 2025-08-14 PROCEDURE — 99195 PHLEBOTOMY: CPT

## 2025-08-14 RX ORDER — SODIUM CHLORIDE 9 MG/ML
INJECTION, SOLUTION INTRAVENOUS CONTINUOUS
Status: DISCONTINUED | OUTPATIENT
Start: 2025-08-14 | End: 2025-08-14 | Stop reason: HOSPADM

## 2025-08-14 RX ORDER — SODIUM CHLORIDE 9 MG/ML
INJECTION, SOLUTION INTRAVENOUS CONTINUOUS
Start: 2025-08-21

## 2025-08-14 RX ADMIN — SODIUM CHLORIDE 250 ML: 9 INJECTION, SOLUTION INTRAVENOUS at 01:08

## 2025-08-15 DIAGNOSIS — D75.1 ERYTHROCYTOSIS: Primary | ICD-10-CM

## 2025-08-20 ENCOUNTER — TELEPHONE (OUTPATIENT)
Dept: HEMATOLOGY/ONCOLOGY | Facility: CLINIC | Age: 45
End: 2025-08-20
Payer: MEDICAID

## 2025-08-21 ENCOUNTER — INFUSION (OUTPATIENT)
Dept: INFUSION THERAPY | Facility: HOSPITAL | Age: 45
End: 2025-08-21
Attending: INTERNAL MEDICINE
Payer: MEDICAID

## 2025-08-21 ENCOUNTER — LAB VISIT (OUTPATIENT)
Dept: HEMATOLOGY/ONCOLOGY | Facility: CLINIC | Age: 45
End: 2025-08-21
Payer: MEDICAID

## 2025-08-21 ENCOUNTER — OFFICE VISIT (OUTPATIENT)
Dept: HEMATOLOGY/ONCOLOGY | Facility: CLINIC | Age: 45
End: 2025-08-21
Payer: MEDICAID

## 2025-08-21 VITALS
WEIGHT: 263.38 LBS | OXYGEN SATURATION: 95 % | RESPIRATION RATE: 16 BRPM | DIASTOLIC BLOOD PRESSURE: 80 MMHG | TEMPERATURE: 99 F | BODY MASS INDEX: 39.01 KG/M2 | HEART RATE: 97 BPM | SYSTOLIC BLOOD PRESSURE: 126 MMHG | HEIGHT: 69 IN

## 2025-08-21 VITALS — HEART RATE: 90 BPM | SYSTOLIC BLOOD PRESSURE: 119 MMHG | DIASTOLIC BLOOD PRESSURE: 81 MMHG

## 2025-08-21 DIAGNOSIS — D75.1 ERYTHROCYTOSIS: Primary | ICD-10-CM

## 2025-08-21 DIAGNOSIS — D72.829 LEUKOCYTOSIS, UNSPECIFIED TYPE: ICD-10-CM

## 2025-08-21 DIAGNOSIS — Z72.0 TOBACCO ABUSE: ICD-10-CM

## 2025-08-21 DIAGNOSIS — D75.1 ERYTHROCYTOSIS: ICD-10-CM

## 2025-08-21 LAB
ALBUMIN SERPL-MCNC: 3.8 G/DL (ref 3.5–5)
ALBUMIN/GLOB SERPL: 1 RATIO (ref 1.1–2)
ALP SERPL-CCNC: 76 UNIT/L (ref 40–150)
ALT SERPL-CCNC: 18 UNIT/L (ref 0–55)
ANION GAP SERPL CALC-SCNC: 7 MEQ/L
AST SERPL-CCNC: 14 UNIT/L (ref 11–45)
BASOPHILS # BLD AUTO: 0.06 X10(3)/MCL
BASOPHILS NFR BLD AUTO: 0.5 %
BILIRUB SERPL-MCNC: 0.3 MG/DL
BUN SERPL-MCNC: 20.8 MG/DL (ref 8.9–20.6)
CALCIUM SERPL-MCNC: 9.4 MG/DL (ref 8.4–10.2)
CHLORIDE SERPL-SCNC: 109 MMOL/L (ref 98–107)
CO2 SERPL-SCNC: 24 MMOL/L (ref 22–29)
CREAT SERPL-MCNC: 0.8 MG/DL (ref 0.72–1.25)
CREAT/UREA NIT SERPL: 26
EOSINOPHIL # BLD AUTO: 0.4 X10(3)/MCL (ref 0–0.9)
EOSINOPHIL NFR BLD AUTO: 3.1 %
ERYTHROCYTE [DISTWIDTH] IN BLOOD BY AUTOMATED COUNT: 12.1 % (ref 11.5–17)
GFR SERPLBLD CREATININE-BSD FMLA CKD-EPI: >60 ML/MIN/1.73/M2
GLOBULIN SER-MCNC: 3.9 GM/DL (ref 2.4–3.5)
GLUCOSE SERPL-MCNC: 185 MG/DL (ref 74–100)
HCT VFR BLD AUTO: 47.4 % (ref 42–52)
HGB BLD-MCNC: 15.9 G/DL (ref 14–18)
IMM GRANULOCYTES # BLD AUTO: 0.07 X10(3)/MCL (ref 0–0.04)
IMM GRANULOCYTES NFR BLD AUTO: 0.5 %
LYMPHOCYTES # BLD AUTO: 2.8 X10(3)/MCL (ref 0.6–4.6)
LYMPHOCYTES NFR BLD AUTO: 21.5 %
MCH RBC QN AUTO: 30.3 PG (ref 27–31)
MCHC RBC AUTO-ENTMCNC: 33.5 G/DL (ref 33–36)
MCV RBC AUTO: 90.5 FL (ref 80–94)
MONOCYTES # BLD AUTO: 0.97 X10(3)/MCL (ref 0.1–1.3)
MONOCYTES NFR BLD AUTO: 7.5 %
NEUTROPHILS # BLD AUTO: 8.71 X10(3)/MCL (ref 2.1–9.2)
NEUTROPHILS NFR BLD AUTO: 66.9 %
NRBC BLD AUTO-RTO: 0 %
PLATELET # BLD AUTO: 306 X10(3)/MCL (ref 130–400)
PMV BLD AUTO: 10.2 FL (ref 7.4–10.4)
POTASSIUM SERPL-SCNC: 4.1 MMOL/L (ref 3.5–5.1)
PROT SERPL-MCNC: 7.7 GM/DL (ref 6.4–8.3)
RBC # BLD AUTO: 5.24 X10(6)/MCL (ref 4.7–6.1)
SODIUM SERPL-SCNC: 140 MMOL/L (ref 136–145)
WBC # BLD AUTO: 13.01 X10(3)/MCL (ref 4.5–11.5)

## 2025-08-21 PROCEDURE — 99195 PHLEBOTOMY: CPT

## 2025-08-21 PROCEDURE — 25000003 PHARM REV CODE 250: Performed by: INTERNAL MEDICINE

## 2025-08-21 PROCEDURE — 80053 COMPREHEN METABOLIC PANEL: CPT

## 2025-08-21 PROCEDURE — 99214 OFFICE O/P EST MOD 30 MIN: CPT | Mod: PBBFAC,25

## 2025-08-21 PROCEDURE — 85025 COMPLETE CBC W/AUTO DIFF WBC: CPT

## 2025-08-21 RX ORDER — SODIUM CHLORIDE 9 MG/ML
INJECTION, SOLUTION INTRAVENOUS CONTINUOUS
OUTPATIENT
Start: 2025-08-21

## 2025-08-21 RX ORDER — GLUCOSAM/CHON-MSM1/C/MANG/BOSW 500-416.6
TABLET ORAL 2 TIMES DAILY
COMMUNITY
Start: 2025-05-07

## 2025-08-21 RX ORDER — SODIUM CHLORIDE 9 MG/ML
INJECTION, SOLUTION INTRAVENOUS CONTINUOUS
Status: DISCONTINUED | OUTPATIENT
Start: 2025-08-21 | End: 2025-08-21 | Stop reason: HOSPADM

## 2025-08-21 RX ORDER — CALCIUM CITRATE/VITAMIN D3 200MG-6.25
TABLET ORAL 2 TIMES DAILY
COMMUNITY
Start: 2025-08-11

## 2025-08-21 RX ORDER — BLOOD-GLUCOSE METER
EACH MISCELLANEOUS 2 TIMES DAILY
COMMUNITY
Start: 2025-05-07

## 2025-08-21 RX ADMIN — SODIUM CHLORIDE: 9 INJECTION, SOLUTION INTRAVENOUS at 10:08

## 2025-08-28 ENCOUNTER — LAB VISIT (OUTPATIENT)
Dept: HEMATOLOGY/ONCOLOGY | Facility: CLINIC | Age: 45
End: 2025-08-28
Payer: MEDICAID

## 2025-08-28 ENCOUNTER — INFUSION (OUTPATIENT)
Dept: INFUSION THERAPY | Facility: HOSPITAL | Age: 45
End: 2025-08-28
Attending: INTERNAL MEDICINE
Payer: MEDICAID

## 2025-08-28 VITALS
HEART RATE: 98 BPM | DIASTOLIC BLOOD PRESSURE: 76 MMHG | SYSTOLIC BLOOD PRESSURE: 124 MMHG | HEIGHT: 69 IN | WEIGHT: 263 LBS | RESPIRATION RATE: 20 BRPM | BODY MASS INDEX: 38.95 KG/M2 | TEMPERATURE: 98 F | OXYGEN SATURATION: 95 %

## 2025-08-28 DIAGNOSIS — D75.1 ERYTHROCYTOSIS: Primary | ICD-10-CM

## 2025-08-28 DIAGNOSIS — D75.1 ERYTHROCYTOSIS: ICD-10-CM

## 2025-08-28 LAB
BASOPHILS # BLD AUTO: 0.08 X10(3)/MCL
BASOPHILS NFR BLD AUTO: 0.5 %
EOSINOPHIL # BLD AUTO: 0.36 X10(3)/MCL (ref 0–0.9)
EOSINOPHIL NFR BLD AUTO: 2.2 %
ERYTHROCYTE [DISTWIDTH] IN BLOOD BY AUTOMATED COUNT: 11.9 % (ref 11.5–17)
HCT VFR BLD AUTO: 48.6 % (ref 42–52)
HGB BLD-MCNC: 15.7 G/DL (ref 14–18)
IMM GRANULOCYTES # BLD AUTO: 0.13 X10(3)/MCL (ref 0–0.04)
IMM GRANULOCYTES NFR BLD AUTO: 0.8 %
LYMPHOCYTES # BLD AUTO: 2.82 X10(3)/MCL (ref 0.6–4.6)
LYMPHOCYTES NFR BLD AUTO: 17 %
MCH RBC QN AUTO: 29.2 PG (ref 27–31)
MCHC RBC AUTO-ENTMCNC: 32.3 G/DL (ref 33–36)
MCV RBC AUTO: 90.3 FL (ref 80–94)
MONOCYTES # BLD AUTO: 1.21 X10(3)/MCL (ref 0.1–1.3)
MONOCYTES NFR BLD AUTO: 7.3 %
NEUTROPHILS # BLD AUTO: 11.94 X10(3)/MCL (ref 2.1–9.2)
NEUTROPHILS NFR BLD AUTO: 72.2 %
NRBC BLD AUTO-RTO: 0 %
PLATELET # BLD AUTO: 358 X10(3)/MCL (ref 130–400)
PMV BLD AUTO: 10.3 FL (ref 7.4–10.4)
RBC # BLD AUTO: 5.38 X10(6)/MCL (ref 4.7–6.1)
WBC # BLD AUTO: 16.54 X10(3)/MCL (ref 4.5–11.5)

## 2025-08-28 PROCEDURE — 99195 PHLEBOTOMY: CPT

## 2025-08-28 PROCEDURE — 85025 COMPLETE CBC W/AUTO DIFF WBC: CPT

## 2025-08-28 RX ORDER — SODIUM CHLORIDE 9 MG/ML
INJECTION, SOLUTION INTRAVENOUS CONTINUOUS
Start: 2025-08-28

## 2025-08-28 RX ORDER — SODIUM CHLORIDE 9 MG/ML
INJECTION, SOLUTION INTRAVENOUS CONTINUOUS
Status: DISCONTINUED | OUTPATIENT
Start: 2025-08-28 | End: 2025-08-28 | Stop reason: HOSPADM

## 2025-09-04 ENCOUNTER — INFUSION (OUTPATIENT)
Dept: INFUSION THERAPY | Facility: HOSPITAL | Age: 45
End: 2025-09-04
Attending: INTERNAL MEDICINE
Payer: MEDICAID

## 2025-09-04 VITALS
SYSTOLIC BLOOD PRESSURE: 134 MMHG | RESPIRATION RATE: 20 BRPM | HEART RATE: 92 BPM | TEMPERATURE: 98 F | DIASTOLIC BLOOD PRESSURE: 86 MMHG | OXYGEN SATURATION: 96 % | WEIGHT: 264.75 LBS | HEIGHT: 69 IN | BODY MASS INDEX: 39.21 KG/M2

## 2025-09-04 DIAGNOSIS — D75.1 ERYTHROCYTOSIS: Primary | ICD-10-CM

## 2025-09-04 PROCEDURE — 99195 PHLEBOTOMY: CPT

## 2025-09-04 PROCEDURE — 25000003 PHARM REV CODE 250: Performed by: INTERNAL MEDICINE

## 2025-09-04 RX ORDER — SODIUM CHLORIDE 9 MG/ML
INJECTION, SOLUTION INTRAVENOUS CONTINUOUS
Start: 2025-09-04

## 2025-09-04 RX ORDER — SODIUM CHLORIDE 9 MG/ML
INJECTION, SOLUTION INTRAVENOUS CONTINUOUS
Status: DISCONTINUED | OUTPATIENT
Start: 2025-09-04 | End: 2025-09-04 | Stop reason: HOSPADM

## 2025-09-04 RX ADMIN — SODIUM CHLORIDE: 9 INJECTION, SOLUTION INTRAVENOUS at 01:09
